# Patient Record
Sex: MALE | Race: BLACK OR AFRICAN AMERICAN | Employment: FULL TIME | URBAN - METROPOLITAN AREA
[De-identification: names, ages, dates, MRNs, and addresses within clinical notes are randomized per-mention and may not be internally consistent; named-entity substitution may affect disease eponyms.]

---

## 2020-02-16 ENCOUNTER — HOSPITAL ENCOUNTER (OUTPATIENT)
Age: 40
Setting detail: OBSERVATION
Discharge: HOME | End: 2020-02-18
Attending: EMERGENCY MEDICINE | Admitting: HOSPITALIST
Payer: COMMERCIAL

## 2020-02-16 DIAGNOSIS — E10.10 DIABETIC KETOACIDOSIS WITHOUT COMA ASSOCIATED WITH TYPE 1 DIABETES MELLITUS (HCC): Primary | ICD-10-CM

## 2020-02-16 PROBLEM — R73.9 HYPERGLYCEMIA: Status: ACTIVE | Noted: 2020-02-16

## 2020-02-16 LAB
ADMINISTERED INITIALS, ADMINIT: NORMAL
ALBUMIN SERPL-MCNC: 5.1 G/DL (ref 3.5–5)
ALBUMIN/GLOB SERPL: 1.1 {RATIO} (ref 1.1–2.2)
ALP SERPL-CCNC: 105 U/L (ref 45–117)
ALT SERPL-CCNC: 31 U/L (ref 12–78)
ANION GAP SERPL CALC-SCNC: 10 MMOL/L (ref 5–15)
ANION GAP SERPL CALC-SCNC: 16 MMOL/L (ref 5–15)
ANION GAP SERPL CALC-SCNC: 7 MMOL/L (ref 5–15)
ARTERIAL PATENCY WRIST A: ABNORMAL
AST SERPL-CCNC: 14 U/L (ref 15–37)
BASE DEFICIT BLD-SCNC: 1 MMOL/L
BASOPHILS # BLD: 0 K/UL (ref 0–0.1)
BASOPHILS NFR BLD: 0 % (ref 0–1)
BDY SITE: ABNORMAL
BILIRUB SERPL-MCNC: 1 MG/DL (ref 0.2–1)
BUN SERPL-MCNC: 14 MG/DL (ref 6–20)
BUN SERPL-MCNC: 14 MG/DL (ref 6–20)
BUN SERPL-MCNC: 19 MG/DL (ref 6–20)
BUN/CREAT SERPL: 10 (ref 12–20)
BUN/CREAT SERPL: 10 (ref 12–20)
BUN/CREAT SERPL: 11 (ref 12–20)
CA-I BLD-SCNC: 1.16 MMOL/L (ref 1.12–1.32)
CALCIUM SERPL-MCNC: 10.4 MG/DL (ref 8.5–10.1)
CALCIUM SERPL-MCNC: 8.2 MG/DL (ref 8.5–10.1)
CALCIUM SERPL-MCNC: 9.1 MG/DL (ref 8.5–10.1)
CHLORIDE SERPL-SCNC: 104 MMOL/L (ref 97–108)
CHLORIDE SERPL-SCNC: 104 MMOL/L (ref 97–108)
CHLORIDE SERPL-SCNC: 90 MMOL/L (ref 97–108)
CO2 SERPL-SCNC: 22 MMOL/L (ref 21–32)
CO2 SERPL-SCNC: 22 MMOL/L (ref 21–32)
CO2 SERPL-SCNC: 23 MMOL/L (ref 21–32)
CREAT SERPL-MCNC: 1.23 MG/DL (ref 0.7–1.3)
CREAT SERPL-MCNC: 1.35 MG/DL (ref 0.7–1.3)
CREAT SERPL-MCNC: 1.92 MG/DL (ref 0.7–1.3)
D50 ADMINISTERED, D50ADM: 0 ML
D50 ORDER, D50ORD: 0 ML
DIFFERENTIAL METHOD BLD: ABNORMAL
EOSINOPHIL # BLD: 0.1 K/UL (ref 0–0.4)
EOSINOPHIL NFR BLD: 1 % (ref 0–7)
ERYTHROCYTE [DISTWIDTH] IN BLOOD BY AUTOMATED COUNT: 12.2 % (ref 11.5–14.5)
EST. AVERAGE GLUCOSE BLD GHB EST-MCNC: 303 MG/DL
GAS FLOW.O2 O2 DELIVERY SYS: ABNORMAL L/MIN
GLOBULIN SER CALC-MCNC: 4.6 G/DL (ref 2–4)
GLSCOM COMMENTS: NORMAL
GLUCOSE BLD STRIP.AUTO-MCNC: 218 MG/DL (ref 65–100)
GLUCOSE BLD STRIP.AUTO-MCNC: 225 MG/DL (ref 65–100)
GLUCOSE BLD STRIP.AUTO-MCNC: 250 MG/DL (ref 65–100)
GLUCOSE BLD STRIP.AUTO-MCNC: 296 MG/DL (ref 65–100)
GLUCOSE BLD STRIP.AUTO-MCNC: 322 MG/DL (ref 65–100)
GLUCOSE BLD STRIP.AUTO-MCNC: 334 MG/DL (ref 65–100)
GLUCOSE BLD STRIP.AUTO-MCNC: 370 MG/DL (ref 65–100)
GLUCOSE BLD STRIP.AUTO-MCNC: >600 MG/DL (ref 65–100)
GLUCOSE BLD STRIP.AUTO-MCNC: >600 MG/DL (ref 65–100)
GLUCOSE SERPL-MCNC: 304 MG/DL (ref 65–100)
GLUCOSE SERPL-MCNC: 328 MG/DL (ref 65–100)
GLUCOSE SERPL-MCNC: 668 MG/DL (ref 65–100)
GLUCOSE, GLC: 218 MG/DL
GLUCOSE, GLC: 225 MG/DL
GLUCOSE, GLC: 250 MG/DL
GLUCOSE, GLC: 296 MG/DL
GLUCOSE, GLC: 334 MG/DL
GLUCOSE, GLC: 370 MG/DL
HBA1C MFR BLD: 12.2 % (ref 4–5.6)
HCO3 BLD-SCNC: 25.2 MMOL/L (ref 22–26)
HCT VFR BLD AUTO: 49.4 % (ref 36.6–50.3)
HGB BLD-MCNC: 17.3 G/DL (ref 12.1–17)
HIGH TARGET, HITG: 300 MG/DL
IMM GRANULOCYTES # BLD AUTO: 0.1 K/UL (ref 0–0.04)
IMM GRANULOCYTES NFR BLD AUTO: 1 % (ref 0–0.5)
INSULIN ADMINSTERED, INSADM: 11 UNITS/HOUR
INSULIN ADMINSTERED, INSADM: 3.3 UNITS/HOUR
INSULIN ADMINSTERED, INSADM: 3.8 UNITS/HOUR
INSULIN ADMINSTERED, INSADM: 4.7 UNITS/HOUR
INSULIN ADMINSTERED, INSADM: 6.3 UNITS/HOUR
INSULIN ADMINSTERED, INSADM: 9.3 UNITS/HOUR
INSULIN ORDER, INSORD: 11 UNITS/HOUR
INSULIN ORDER, INSORD: 3.3 UNITS/HOUR
INSULIN ORDER, INSORD: 3.8 UNITS/HOUR
INSULIN ORDER, INSORD: 4.7 UNITS/HOUR
INSULIN ORDER, INSORD: 6.3 UNITS/HOUR
INSULIN ORDER, INSORD: 9.3 UNITS/HOUR
LOW TARGET, LOT: 200 MG/DL
LYMPHOCYTES # BLD: 3.2 K/UL (ref 0.8–3.5)
LYMPHOCYTES NFR BLD: 27 % (ref 12–49)
MAGNESIUM SERPL-MCNC: 1.9 MG/DL (ref 1.6–2.4)
MCH RBC QN AUTO: 28.9 PG (ref 26–34)
MCHC RBC AUTO-ENTMCNC: 35 G/DL (ref 30–36.5)
MCV RBC AUTO: 82.6 FL (ref 80–99)
MINUTES UNTIL NEXT BG, NBG: 60 MIN
MONOCYTES # BLD: 0.6 K/UL (ref 0–1)
MONOCYTES NFR BLD: 6 % (ref 5–13)
MULTIPLIER, MUL: 0.02
MULTIPLIER, MUL: 0.03
MULTIPLIER, MUL: 0.04
MULTIPLIER, MUL: 0.04
NEUTS SEG # BLD: 7.6 K/UL (ref 1.8–8)
NEUTS SEG NFR BLD: 65 % (ref 32–75)
NRBC # BLD: 0 K/UL (ref 0–0.01)
NRBC BLD-RTO: 0 PER 100 WBC
ORDER INITIALS, ORDINIT: NORMAL
PCO2 BLD: 48.2 MMHG (ref 35–45)
PH BLD: 7.33 [PH] (ref 7.35–7.45)
PHOSPHATE SERPL-MCNC: 2.5 MG/DL (ref 2.6–4.7)
PLATELET # BLD AUTO: 229 K/UL (ref 150–400)
PMV BLD AUTO: 12.5 FL (ref 8.9–12.9)
PO2 BLD: 39 MMHG (ref 80–100)
POTASSIUM SERPL-SCNC: 3.5 MMOL/L (ref 3.5–5.1)
POTASSIUM SERPL-SCNC: 3.7 MMOL/L (ref 3.5–5.1)
POTASSIUM SERPL-SCNC: 4.2 MMOL/L (ref 3.5–5.1)
PROT SERPL-MCNC: 9.7 G/DL (ref 6.4–8.2)
RBC # BLD AUTO: 5.98 M/UL (ref 4.1–5.7)
SAO2 % BLD: 69 % (ref 92–97)
SERVICE CMNT-IMP: ABNORMAL
SODIUM SERPL-SCNC: 129 MMOL/L (ref 136–145)
SODIUM SERPL-SCNC: 133 MMOL/L (ref 136–145)
SODIUM SERPL-SCNC: 136 MMOL/L (ref 136–145)
SPECIMEN TYPE: ABNORMAL
TOTAL RESP. RATE, ITRR: 18
WBC # BLD AUTO: 11.6 K/UL (ref 4.1–11.1)

## 2020-02-16 PROCEDURE — 82962 GLUCOSE BLOOD TEST: CPT

## 2020-02-16 PROCEDURE — 74011250637 HC RX REV CODE- 250/637: Performed by: STUDENT IN AN ORGANIZED HEALTH CARE EDUCATION/TRAINING PROGRAM

## 2020-02-16 PROCEDURE — 74011250637 HC RX REV CODE- 250/637: Performed by: HOSPITALIST

## 2020-02-16 PROCEDURE — 96361 HYDRATE IV INFUSION ADD-ON: CPT

## 2020-02-16 PROCEDURE — 74011636637 HC RX REV CODE- 636/637: Performed by: HOSPITALIST

## 2020-02-16 PROCEDURE — 80053 COMPREHEN METABOLIC PANEL: CPT

## 2020-02-16 PROCEDURE — 96366 THER/PROPH/DIAG IV INF ADDON: CPT

## 2020-02-16 PROCEDURE — 74011250636 HC RX REV CODE- 250/636: Performed by: INTERNAL MEDICINE

## 2020-02-16 PROCEDURE — 74011636637 HC RX REV CODE- 636/637: Performed by: STUDENT IN AN ORGANIZED HEALTH CARE EDUCATION/TRAINING PROGRAM

## 2020-02-16 PROCEDURE — 83036 HEMOGLOBIN GLYCOSYLATED A1C: CPT

## 2020-02-16 PROCEDURE — 96376 TX/PRO/DX INJ SAME DRUG ADON: CPT

## 2020-02-16 PROCEDURE — 74011000258 HC RX REV CODE- 258: Performed by: HOSPITALIST

## 2020-02-16 PROCEDURE — 84100 ASSAY OF PHOSPHORUS: CPT

## 2020-02-16 PROCEDURE — 74011636637 HC RX REV CODE- 636/637: Performed by: INTERNAL MEDICINE

## 2020-02-16 PROCEDURE — 82803 BLOOD GASES ANY COMBINATION: CPT

## 2020-02-16 PROCEDURE — 99218 HC RM OBSERVATION: CPT

## 2020-02-16 PROCEDURE — 99284 EMERGENCY DEPT VISIT MOD MDM: CPT

## 2020-02-16 PROCEDURE — 80048 BASIC METABOLIC PNL TOTAL CA: CPT

## 2020-02-16 PROCEDURE — 74011250636 HC RX REV CODE- 250/636: Performed by: STUDENT IN AN ORGANIZED HEALTH CARE EDUCATION/TRAINING PROGRAM

## 2020-02-16 PROCEDURE — 85025 COMPLETE CBC W/AUTO DIFF WBC: CPT

## 2020-02-16 PROCEDURE — 96365 THER/PROPH/DIAG IV INF INIT: CPT

## 2020-02-16 PROCEDURE — 36415 COLL VENOUS BLD VENIPUNCTURE: CPT

## 2020-02-16 PROCEDURE — 65660000000 HC RM CCU STEPDOWN

## 2020-02-16 PROCEDURE — 83735 ASSAY OF MAGNESIUM: CPT

## 2020-02-16 RX ORDER — ENOXAPARIN SODIUM 100 MG/ML
40 INJECTION SUBCUTANEOUS EVERY 24 HOURS
Status: DISCONTINUED | OUTPATIENT
Start: 2020-02-16 | End: 2020-02-18 | Stop reason: HOSPADM

## 2020-02-16 RX ORDER — INSULIN LISPRO 100 [IU]/ML
INJECTION, SOLUTION INTRAVENOUS; SUBCUTANEOUS
Status: DISCONTINUED | OUTPATIENT
Start: 2020-02-16 | End: 2020-02-16

## 2020-02-16 RX ORDER — SODIUM CHLORIDE 0.9 % (FLUSH) 0.9 %
5-40 SYRINGE (ML) INJECTION EVERY 8 HOURS
Status: DISCONTINUED | OUTPATIENT
Start: 2020-02-16 | End: 2020-02-18 | Stop reason: HOSPADM

## 2020-02-16 RX ORDER — SODIUM CHLORIDE 450 MG/100ML
100 INJECTION, SOLUTION INTRAVENOUS CONTINUOUS
Status: DISCONTINUED | OUTPATIENT
Start: 2020-02-16 | End: 2020-02-16

## 2020-02-16 RX ORDER — DEXTROSE 50 % IN WATER (D50W) INTRAVENOUS SYRINGE
12.5-25 AS NEEDED
Status: DISCONTINUED | OUTPATIENT
Start: 2020-02-16 | End: 2020-02-18 | Stop reason: HOSPADM

## 2020-02-16 RX ORDER — MAGNESIUM SULFATE 100 %
4 CRYSTALS MISCELLANEOUS AS NEEDED
Status: DISCONTINUED | OUTPATIENT
Start: 2020-02-16 | End: 2020-02-16

## 2020-02-16 RX ORDER — ACETAMINOPHEN 325 MG/1
650 TABLET ORAL
Status: DISCONTINUED | OUTPATIENT
Start: 2020-02-16 | End: 2020-02-18 | Stop reason: HOSPADM

## 2020-02-16 RX ORDER — DEXTROSE, SODIUM CHLORIDE, AND POTASSIUM CHLORIDE 5; .45; .15 G/100ML; G/100ML; G/100ML
100 INJECTION INTRAVENOUS CONTINUOUS
Status: DISCONTINUED | OUTPATIENT
Start: 2020-02-16 | End: 2020-02-16

## 2020-02-16 RX ORDER — INSULIN GLARGINE 100 [IU]/ML
30 INJECTION, SOLUTION SUBCUTANEOUS
Status: DISCONTINUED | OUTPATIENT
Start: 2020-02-16 | End: 2020-02-18 | Stop reason: HOSPADM

## 2020-02-16 RX ORDER — MAGNESIUM SULFATE 100 %
4 CRYSTALS MISCELLANEOUS AS NEEDED
Status: DISCONTINUED | OUTPATIENT
Start: 2020-02-16 | End: 2020-02-18 | Stop reason: HOSPADM

## 2020-02-16 RX ORDER — INSULIN LISPRO 100 [IU]/ML
INJECTION, SOLUTION INTRAVENOUS; SUBCUTANEOUS
Status: DISCONTINUED | OUTPATIENT
Start: 2020-02-16 | End: 2020-02-18 | Stop reason: HOSPADM

## 2020-02-16 RX ORDER — ONDANSETRON 2 MG/ML
4 INJECTION INTRAMUSCULAR; INTRAVENOUS
Status: DISCONTINUED | OUTPATIENT
Start: 2020-02-16 | End: 2020-02-18 | Stop reason: HOSPADM

## 2020-02-16 RX ORDER — POTASSIUM CHLORIDE 20 MEQ/1
40 TABLET, EXTENDED RELEASE ORAL
Status: COMPLETED | OUTPATIENT
Start: 2020-02-16 | End: 2020-02-16

## 2020-02-16 RX ORDER — AMLODIPINE BESYLATE 5 MG/1
5 TABLET ORAL DAILY
Status: DISCONTINUED | OUTPATIENT
Start: 2020-02-16 | End: 2020-02-18 | Stop reason: HOSPADM

## 2020-02-16 RX ORDER — DEXTROSE 50 % IN WATER (D50W) INTRAVENOUS SYRINGE
25-50 AS NEEDED
Status: DISCONTINUED | OUTPATIENT
Start: 2020-02-16 | End: 2020-02-16

## 2020-02-16 RX ORDER — SODIUM CHLORIDE 0.9 % (FLUSH) 0.9 %
5-40 SYRINGE (ML) INJECTION AS NEEDED
Status: DISCONTINUED | OUTPATIENT
Start: 2020-02-16 | End: 2020-02-18 | Stop reason: HOSPADM

## 2020-02-16 RX ORDER — POTASSIUM CHLORIDE AND SODIUM CHLORIDE 450; 150 MG/100ML; MG/100ML
INJECTION, SOLUTION INTRAVENOUS CONTINUOUS
Status: DISCONTINUED | OUTPATIENT
Start: 2020-02-16 | End: 2020-02-17

## 2020-02-16 RX ADMIN — INSULIN LISPRO 2 UNITS: 100 INJECTION, SOLUTION INTRAVENOUS; SUBCUTANEOUS at 22:12

## 2020-02-16 RX ADMIN — SODIUM CHLORIDE 4.7 UNITS/HR: 9 INJECTION, SOLUTION INTRAVENOUS at 16:34

## 2020-02-16 RX ADMIN — AMLODIPINE BESYLATE 5 MG: 5 TABLET ORAL at 16:40

## 2020-02-16 RX ADMIN — INSULIN GLARGINE 30 UNITS: 100 INJECTION, SOLUTION SUBCUTANEOUS at 22:12

## 2020-02-16 RX ADMIN — SODIUM CHLORIDE 3.3 UNITS/HR: 9 INJECTION, SOLUTION INTRAVENOUS at 18:45

## 2020-02-16 RX ADMIN — HUMAN INSULIN 10 UNITS: 100 INJECTION, SOLUTION SUBCUTANEOUS at 14:46

## 2020-02-16 RX ADMIN — Medication 10 ML: at 22:13

## 2020-02-16 RX ADMIN — SODIUM CHLORIDE 100 ML/HR: 450 INJECTION, SOLUTION INTRAVENOUS at 16:38

## 2020-02-16 RX ADMIN — SODIUM CHLORIDE 1000 ML: 900 INJECTION, SOLUTION INTRAVENOUS at 12:59

## 2020-02-16 RX ADMIN — SODIUM CHLORIDE 1000 ML: 900 INJECTION, SOLUTION INTRAVENOUS at 14:48

## 2020-02-16 RX ADMIN — POTASSIUM CHLORIDE 40 MEQ: 20 TABLET, EXTENDED RELEASE ORAL at 14:50

## 2020-02-16 RX ADMIN — DEXTROSE MONOHYDRATE, SODIUM CHLORIDE, AND POTASSIUM CHLORIDE 100 ML/HR: 50; 4.5; 1.49 INJECTION, SOLUTION INTRAVENOUS at 19:52

## 2020-02-16 RX ADMIN — POTASSIUM CHLORIDE AND SODIUM CHLORIDE: 450; 150 INJECTION, SOLUTION INTRAVENOUS at 22:15

## 2020-02-16 RX ADMIN — SODIUM CHLORIDE 1000 ML: 900 INJECTION, SOLUTION INTRAVENOUS at 14:44

## 2020-02-16 NOTE — PROGRESS NOTES
HP dictated    Mild DKA on Insulin drip stop when gap is closed  Intravascular volume depletion IV fluids.

## 2020-02-16 NOTE — ED PROVIDER NOTES
EMERGENCY DEPARTMENT HISTORY AND PHYSICAL EXAM          Date: 2/16/2020  Patient Name: Lavell Stephens  Attending of Record: Racheal Santiago    History of Presenting Illness     Chief Complaint   Patient presents with    High Blood Sugar     was sent by urgent care for 's; no PMH of DM; reports blurred vision and excessive thirst for one week; denies pain       History Provided By: Patient    HPI: Lavell Stephens is a 46 y.o. male with a medical history of hypertension who presents for hyper glycemia. Patient reports over the last several days he has had polyuria in addition to polydipsia. Patient has been having ongoing diaphoresis for several days as well in addition to nausea and vomiting. Patient denies any fever or abdominal pain. Has no history of diabetes that he is aware of. [facility] and was found to be hyperglycemic in the 800s and was transferred here for further care. PCP: No primary care provider on file. There are no other complaints, changes, or physical findings at this time.      Current Facility-Administered Medications   Medication Dose Route Frequency Provider Last Rate Last Dose    sodium chloride 0.9 % bolus infusion 1,000 mL  1,000 mL IntraVENous ONCE Herrera Kramer MD 1,000 mL/hr at 02/16/20 1259 1,000 mL at 02/16/20 1259       Past History     Past Medical History:  Past Medical History:   Diagnosis Date    Asthma     Bronchitis     Hypertension        Past Surgical History:  -denies   Family History:  -DM    Social History:  Social History     Tobacco Use    Smoking status: Current Every Day Smoker     Packs/day: 0.25    Tobacco comment: 1 per day   Substance Use Topics    Alcohol use: No    Drug use: No       Allergies:  No Known Allergies      Review of Systems   Review of Systems    General: fatigue, diaphoresis     Head and Neck:  No headache    Cardiovascular: No chest pain or palpitations    Pulmonary: No SOB, cough or dyspnea    GI: No abdominal pain, diarrhea or constipation    : No dysuria or difficulty urinating     Musculoskeletal: No swelling or deformity     Neuro: No numbness or tingling    Endo: polyuria and polydipsia     Skin: No rashes or wounds     Psych: No depression or anxiety    Physical Exam   Physical Exam  Gen: jovial, well appearing   HEENT: Normocephalic, atraumatic. EOMI. Oral mucosa dry. Neck: Supple, trachea midline  Heart: tachycardic rate, regular rhythm. No murmurs, rubs or bruits. Lungs: Full chest expansion. CTAB. No wheezing or abnormal breath sounds. Abdomen: Soft, nontender, nondistended. No rebound or guarding   Extremities: No edema, No erythema   Nervous System: CN II-XII grossly intact, gross motor and sensation intact   Musculosketal: Strength 5+ in all extremities. Full ROM. Radial pulses 2+  Skin: No rashes, cuts or breaks in the skin. Diagnostic Study Results     Labs -     Recent Results (from the past 12 hour(s))   GLUCOSE, POC    Collection Time: 02/16/20 12:49 PM   Result Value Ref Range    Glucose (POC) >600 (HH) 65 - 100 mg/dL    Performed by Whit Gary, POC    Collection Time: 02/16/20 12:51 PM   Result Value Ref Range    Glucose (POC) >600 (HH) 65 - 100 mg/dL    Performed by Blanca Cantrell        Radiologic Studies -   No orders to display     CT Results  (Last 48 hours)    None        CXR Results  (Last 48 hours)    None            Medical Decision Making   I am the first provider for this patient. I reviewed the vital signs, available nursing notes, past medical history, past surgical history, family history and social history. Vital Signs-Reviewed the patient's vital signs.   Patient Vitals for the past 12 hrs:   Temp Pulse Resp BP SpO2   02/16/20 1304    (!) 135/98    02/16/20 1243 97.7 °F (36.5 °C) (!) 137 18 (!) 180/111 100 %       Records Reviewed: Nursing Notes    Provider Notes (Medical Decision Making):     DDx: This is a 72-year-old male who presents with hyperglycemia. Vital signs show tachycardia. Suspect this is new onset diabetes. No sign of infection or alternate etiology. Patient does look significantly dehydrated, will hydrate with IV fluids. Will evaluate for DKA or HHS. Will treat the patient with IV insulin and replete electrolytes aggressively. ED Course:   Initial assessment performed. The patients presenting problems have been discussed, and they are in agreement with the care plan formulated and outlined with them. I have encouraged them to ask questions as they arise throughout their visit. ED Course as of Feb 16 1408   Sun Feb 16, 2020   1335 [facility] to see residents patient. Patient states that he has lost weight, has been having polyuria and polydipsia. Noted to have a very high sugar here. Patient denies any infectious triggers. No prior history of diabetes but does eat a lot of carbs as well as sweets. States that he loves them. His father does have diabetes. On physical exam very dry mucous membranes, patient very alert and answering questions and oriented. Differential includes type I versus type 2 diabetes, will rule out DKA by getting labs and urinalysis as well as VBG. Will need insulin and fluids    [JS]      ED Course User Index  [JS] Wynonia Kayser, MD       Progress note:  Patient remains clinically stable. Ongoing tachycardia. Patient updated on lab results which were significant for hyponatremia, hyperglycemia, increased anion gap, probable acute kidney injury. Discussed with hospitalist who will admit the patient. Additional IV fluids ordered per their request.  IV insulin ordered as well. Diagnosis     Clinical Impression: No diagnosis found. Disposition:  -Admit     PLAN:  1. There are no discharge medications for this patient.     2.   Follow-up Information    None       Return to ED if worse

## 2020-02-16 NOTE — ED NOTES
TRANSFER - OUT REPORT:    Verbal report given to alonso(name) on Alexandro Mckinney  being transferred to pcu(unit) for routine progression of care       Report consisted of patients Situation, Background, Assessment and   Recommendations(SBAR). Information from the following report(s) SBAR, ED Summary and MAR was reviewed with the receiving nurse. Lines:   Peripheral IV 02/16/20 Right Antecubital (Active)   Site Assessment Clean, dry, & intact 2/16/2020  1:00 PM   Phlebitis Assessment 0 2/16/2020  1:00 PM   Infiltration Assessment 0 2/16/2020  1:00 PM   Dressing Status Clean, dry, & intact 2/16/2020  1:00 PM   Dressing Type Transparent 2/16/2020  1:00 PM   Hub Color/Line Status Pink 2/16/2020  1:00 PM        Opportunity for questions and clarification was provided.       Patient transported with:   Registered Nurse monitor

## 2020-02-17 LAB
ANION GAP SERPL CALC-SCNC: 8 MMOL/L (ref 5–15)
BUN SERPL-MCNC: 12 MG/DL (ref 6–20)
BUN/CREAT SERPL: 10 (ref 12–20)
CALCIUM SERPL-MCNC: 9.2 MG/DL (ref 8.5–10.1)
CHLORIDE SERPL-SCNC: 103 MMOL/L (ref 97–108)
CO2 SERPL-SCNC: 23 MMOL/L (ref 21–32)
CREAT SERPL-MCNC: 1.2 MG/DL (ref 0.7–1.3)
ERYTHROCYTE [DISTWIDTH] IN BLOOD BY AUTOMATED COUNT: 12.2 % (ref 11.5–14.5)
EST. AVERAGE GLUCOSE BLD GHB EST-MCNC: 295 MG/DL
GLUCOSE BLD STRIP.AUTO-MCNC: 285 MG/DL (ref 65–100)
GLUCOSE BLD STRIP.AUTO-MCNC: 288 MG/DL (ref 65–100)
GLUCOSE BLD STRIP.AUTO-MCNC: 288 MG/DL (ref 65–100)
GLUCOSE BLD STRIP.AUTO-MCNC: 295 MG/DL (ref 65–100)
GLUCOSE SERPL-MCNC: 274 MG/DL (ref 65–100)
HBA1C MFR BLD: 11.9 % (ref 4–5.6)
HCT VFR BLD AUTO: 44 % (ref 36.6–50.3)
HGB BLD-MCNC: 14.6 G/DL (ref 12.1–17)
MAGNESIUM SERPL-MCNC: 2.2 MG/DL (ref 1.6–2.4)
MCH RBC QN AUTO: 28.3 PG (ref 26–34)
MCHC RBC AUTO-ENTMCNC: 33.2 G/DL (ref 30–36.5)
MCV RBC AUTO: 85.4 FL (ref 80–99)
NRBC # BLD: 0 K/UL (ref 0–0.01)
NRBC BLD-RTO: 0 PER 100 WBC
PLATELET # BLD AUTO: 181 K/UL (ref 150–400)
PMV BLD AUTO: 11.7 FL (ref 8.9–12.9)
POTASSIUM SERPL-SCNC: 4.3 MMOL/L (ref 3.5–5.1)
RBC # BLD AUTO: 5.15 M/UL (ref 4.1–5.7)
SERVICE CMNT-IMP: ABNORMAL
SODIUM SERPL-SCNC: 134 MMOL/L (ref 136–145)
WBC # BLD AUTO: 10.1 K/UL (ref 4.1–11.1)

## 2020-02-17 PROCEDURE — 36415 COLL VENOUS BLD VENIPUNCTURE: CPT

## 2020-02-17 PROCEDURE — 74011636637 HC RX REV CODE- 636/637: Performed by: INTERNAL MEDICINE

## 2020-02-17 PROCEDURE — 65270000015 HC RM PRIVATE ONCOLOGY

## 2020-02-17 PROCEDURE — 74011250637 HC RX REV CODE- 250/637: Performed by: HOSPITALIST

## 2020-02-17 PROCEDURE — 74011250636 HC RX REV CODE- 250/636: Performed by: INTERNAL MEDICINE

## 2020-02-17 PROCEDURE — 83036 HEMOGLOBIN GLYCOSYLATED A1C: CPT

## 2020-02-17 PROCEDURE — 77010033678 HC OXYGEN DAILY

## 2020-02-17 PROCEDURE — 99218 HC RM OBSERVATION: CPT

## 2020-02-17 PROCEDURE — 96361 HYDRATE IV INFUSION ADD-ON: CPT

## 2020-02-17 PROCEDURE — 83735 ASSAY OF MAGNESIUM: CPT

## 2020-02-17 PROCEDURE — 80048 BASIC METABOLIC PNL TOTAL CA: CPT

## 2020-02-17 PROCEDURE — 82962 GLUCOSE BLOOD TEST: CPT

## 2020-02-17 PROCEDURE — 85027 COMPLETE CBC AUTOMATED: CPT

## 2020-02-17 RX ORDER — INSULIN LISPRO 100 [IU]/ML
5 INJECTION, SOLUTION INTRAVENOUS; SUBCUTANEOUS
Status: DISCONTINUED | OUTPATIENT
Start: 2020-02-17 | End: 2020-02-18 | Stop reason: HOSPADM

## 2020-02-17 RX ORDER — SODIUM CHLORIDE AND POTASSIUM CHLORIDE .9; .15 G/100ML; G/100ML
SOLUTION INTRAVENOUS CONTINUOUS
Status: DISCONTINUED | OUTPATIENT
Start: 2020-02-17 | End: 2020-02-18 | Stop reason: HOSPADM

## 2020-02-17 RX ADMIN — INSULIN LISPRO 5 UNITS: 100 INJECTION, SOLUTION INTRAVENOUS; SUBCUTANEOUS at 11:29

## 2020-02-17 RX ADMIN — INSULIN LISPRO 3 UNITS: 100 INJECTION, SOLUTION INTRAVENOUS; SUBCUTANEOUS at 21:29

## 2020-02-17 RX ADMIN — INSULIN LISPRO 5 UNITS: 100 INJECTION, SOLUTION INTRAVENOUS; SUBCUTANEOUS at 07:57

## 2020-02-17 RX ADMIN — Medication 10 ML: at 21:29

## 2020-02-17 RX ADMIN — AMLODIPINE BESYLATE 5 MG: 5 TABLET ORAL at 09:40

## 2020-02-17 RX ADMIN — INSULIN GLARGINE 30 UNITS: 100 INJECTION, SOLUTION SUBCUTANEOUS at 21:28

## 2020-02-17 RX ADMIN — INSULIN LISPRO 5 UNITS: 100 INJECTION, SOLUTION INTRAVENOUS; SUBCUTANEOUS at 17:11

## 2020-02-17 RX ADMIN — INSULIN LISPRO 5 UNITS: 100 INJECTION, SOLUTION INTRAVENOUS; SUBCUTANEOUS at 17:12

## 2020-02-17 RX ADMIN — Medication 5 ML: at 06:05

## 2020-02-17 RX ADMIN — Medication 10 ML: at 15:05

## 2020-02-17 RX ADMIN — SODIUM CHLORIDE AND POTASSIUM CHLORIDE: 9; 1.49 INJECTION, SOLUTION INTRAVENOUS at 15:03

## 2020-02-17 RX ADMIN — SODIUM CHLORIDE AND POTASSIUM CHLORIDE: 9; 1.49 INJECTION, SOLUTION INTRAVENOUS at 21:47

## 2020-02-17 RX ADMIN — INSULIN LISPRO 5 UNITS: 100 INJECTION, SOLUTION INTRAVENOUS; SUBCUTANEOUS at 11:28

## 2020-02-17 NOTE — ROUTINE PROCESS
..Report received from  Erich Castaneda, [facility]. SBAR was discussed.  
 
Des Michelle RN

## 2020-02-17 NOTE — PROGRESS NOTES
Hospitalist Progress Note    NAME: Aba Morris   :  1980   MRN:  [id number]       Assessment / Plan:    DM II, new diagnosis  Mild DKA, resolved  Hyperglycemia  Dehydration  -A1c 11.8% on admission  - family hx of DM  - off insulin drip  - DM education   - correction scale and lantus 30 units nightly (around 0.3 unit/kg)  -patient is worried about starting Insulin, so given his high A1c and weight consider GLP-1 inhibitor as pot patient if possible. HTN  -uncontrolled  - cont Norvasc 5 mg daily    30.0 - 39.9 Obese / Body mass index is 38.19 kg/m². Code status: Full  Prophylaxis: Lovenox  Recommended Disposition: Home w/Family     Subjective:     Chief Complaint / Reason for Physician Visit  DKA/New DM/dehydration  Discussed with RN events overnight. Family at bedside, all questions were answered. Review of Systems:  Symptom Y/N Comments  Symptom Y/N Comments   Fever/Chills n   Chest Pain n    Poor Appetite n   Edema n    Cough n   Abdominal Pain n    Sputum n   Joint Pain     SOB/NOLEN n   Pruritis/Rash     Nausea/vomit n   Tolerating PT/OT y    Diarrhea n   Tolerating Diet y    Constipation n   Other           Objective:     VITALS:   Last 24hrs VS reviewed since prior progress note. Most recent are:  Patient Vitals for the past 24 hrs:   Temp Pulse Resp BP SpO2   20 1109 98 °F (36.7 °C) (!) 105 18 134/77 97 %   20 0940  (!) 112  126/73    20 0742 98.9 °F (37.2 °C) (!) 114 19 (!) 142/95 98 %   20 1930 99 °F (37.2 °C) (!) 103 18 137/74 98 %   20 1724    (!) 136/97    20 1723 99.4 °F (37.4 °C) (!) 114 18 (!) 136/97 99 %   20 1700  (!) 109  119/74 98 %       Intake/Output Summary (Last 24 hours) at 2020 1513  Last data filed at 2020 1127  Gross per 24 hour   Intake 1170 ml   Output    Net 1170 ml        PHYSICAL EXAM:  General: WD, WN. Alert, cooperative, no acute distress    EENT:  EOMI. Anicteric sclerae.  MMM  Resp:  CTA bilaterally, no wheezing or rales. No accessory muscle use  CV:  Regular  rhythm,  No edema  GI:  Soft, Non distended, Non tender.  +Bowel sounds  Neurologic:  Alert and oriented X 3, normal speech,   Psych:   Good insight. Not anxious nor agitated  Skin:  No rashes. No jaundice    Reviewed most current lab test results and cultures  YES  Reviewed most current radiology test results   YES  Review and summation of old records today    NO  Reviewed patient's current orders and MAR    YES  PMH/SH reviewed - no change compared to H&P  ________________________________________________________________________  Care Plan discussed with:    Comments   Patient x    Family  x    RN x    Care Manager x    Consultant                       x Multidiciplinary team rounds were held today with , nursing, pharmacist and clinical coordinator. Patient's plan of care was discussed; medications were reviewed and discharge planning was addressed. ________________________________________________________________________  Total NON critical care TIME:  35   Minutes    Total CRITICAL CARE TIME Spent:   Minutes non procedure based      Comments   >50% of visit spent in counseling and coordination of care     ________________________________________________________________________  Analy Ricci MD     Procedures: see electronic medical records for all procedures/Xrays and details which were not copied into this note but were reviewed prior to creation of Plan. LABS:  I reviewed today's most current labs and imaging studies.   Pertinent labs include:  Recent Labs     02/17/20  0429 02/16/20  1251   WBC 10.1 11.6*   HGB 14.6 17.3*   HCT 44.0 49.4    229     Recent Labs     02/17/20  0429 02/16/20  2051 02/16/20  1628 02/16/20  1251   * 133* 136 129*   K 4.3 3.5 3.7 4.2    104 104 90*   CO2 23 22 22 23   * 328* 304* 668*   BUN 12 14 14 19   CREA 1.20 1.23 1.35* 1.92*   CA 9.2 9.1 8.2* 10.4*   MG 2.2  --  1.9  --    PHOS  --   --  2.5*  --    ALB  --   --   --  5.1*   TBILI  --   --   --  1.0   SGOT  --   --   --  14*   ALT  --   --   --  31       Signed: Haider Fountain MD

## 2020-02-17 NOTE — PROGRESS NOTES
Bedside and Verbal shift change report given to Greta Lombardo (oncoming nurse) by Luz Hodgkins (offgoing nurse). Report included the following information SBAR, Kardex, Procedure Summary, Intake/Output, MAR and Recent Results. 2953: Patient given 5 units of insulin for blood sugar of 295, patient sitting up on side of bed eating breakfast.  Has questions about insulin and medications needed for new diagnosis of diabetes. Diabetes management team consult placed.     0940: Diabetes treatment team nurse at bedside to talk with patient and patient's family    0: Hospitalist in to see patient, patient sitting up in chair    1830: Report given to Fouzia Mercer on patient being transferred to room [phone]

## 2020-02-17 NOTE — PROGRESS NOTES
Reason for Admission:   hyperglycemia                   RUR Score:  7%                   Plan for utilizing home health:  Pt has no plans to utilize [facility] at this time but may benefit from a [facility]                        Current Advanced Directive/Advance Care Plan: Pt is a full code and does not have an ACP on file at this time. Transition of Care Plan:  CM met with pt to discuss d/c planning. CM Verified pt demographic, insurance, and PCP. Pt resides in a home with with his parents. He resports that he is independent with ADLs/IADLs and also drives. Pt has supportive family including fiance. Pt reports that he does not have a PCP and goes to Urgent Care when medical attention is needed. [facility] [address] For medications. Pt will discharge home with family. Fiance will transport him home. Pt will decide on his PCP and CM will make appts. CM will continue to follow patient for discharge planning needs and arrange for services as deemed necessary. Care Management Interventions  PCP Verified by CM: Yes  Mode of Transport at Discharge:  Other (see comment)  Transition of Care Consult (CM Consult): Discharge Planning  Current Support Network: Relative's Home  Confirm Follow Up Transport: Family  The Plan for Transition of Care is Related to the Following Treatment Goals : home   Discharge Location  Discharge Placement: Home      Harriet Ricks, Care Manager  [phone]

## 2020-02-17 NOTE — DIABETES MGMT
[facility]  CLINICAL NURSE SPECIALIST CONSULT  [facility]    Presentation   Alexandro Medina is a 46 y.o. male admitted from ER in mild DKA. BG initially in 800s. Hydration & insulin therapy underway. Transitioned off Glucostabilizer with 30 units of Lantus insulin last evening. A1c 11.9%. Current clinical course has been uncomplicated. Patient has newly diagnosed Type 2 diabetes. Strong family hx of diabetes in father, maternal grandmother and many cousins. He went to [facility] 3 weeks ago. No blood drawn per girlfriend. Patient reports not feeling right which prompted him to seek help again. C/o of polyuria, polydipsia and blurred vision. This diagnosis took him by surprise and he plans on \"doing whatever because I don't want to do this again. \"   Consulted by Provider for advanced diabetes nursing assessment and care, specifically related to   [x] Inpatient management strategy  [x] Survival skill education    Subjective   I just learned I have diabetes.     Patients that he was in senior living and gained weight to 260 lbs. Since release in April of 2019, he has lost weight down to current weight. He admits to poor eating choices but is willing to change. Patient reports the following home diabetes self-care practices:  Eating pattern-Mother does cooking in the evenings at home and prepares a meal that is well-balanced as patient's father has diabetes  [x] Breakfast 8am 1-2 [employer] biscuits with hash browns and sweet tea or OJ  [x] Lunch  3pm [employer] , fries and sweet tea. Or leftovers  [x] Dinner  6pm Chicken nuggets or shrimp with fries and sweet tea. If mother makes evening meal, it is a well-balanced meal including protein, starch and vegetables  [x] Beverages Sweet tea. OJ. 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  Is too tired on the weekends to walk or do anything physical  Monitoring pattern-Just diagnosed  Taking medications pattern-Just diagnosed    Objective   Physical exam  General Alert, oriented and in no acute distress. Conversant and cooperative. Frightened of new diagnosis  Vital Signs   Visit Vitals  /73   Pulse (!) 112   Temp 98.9 °F (37.2 °C)   Resp 19   Ht 5' 5\" (1.651 m)   Wt 104.1 kg (229 lb 8 oz)   SpO2 98%   BMI 38.19 kg/m²     Laboratory  Lab Results   Component Value Date/Time    Hemoglobin A1c 11.9 (H) 02/17/2020 04:29 AM     No results found for: LDL, LDLC, DLDLP  Lab Results   Component Value Date/Time    Creatinine 1.20 02/17/2020 04:29 AM     Lab Results   Component Value Date/Time    Sodium 134 (L) 02/17/2020 04:29 AM    Potassium 4.3 02/17/2020 04:29 AM    Chloride 103 02/17/2020 04:29 AM    CO2 23 02/17/2020 04:29 AM    Anion gap 8 02/17/2020 04:29 AM    Glucose 274 (H) 02/17/2020 04:29 AM    BUN 12 02/17/2020 04:29 AM    Creatinine 1.20 02/17/2020 04:29 AM    BUN/Creatinine ratio 10 (L) 02/17/2020 04:29 AM    GFR est AA >60 02/17/2020 04:29 AM    GFR est non-AA >60 02/17/2020 04:29 AM    Calcium 9.2 02/17/2020 04:29 AM    Bilirubin, total 1.0 02/16/2020 12:51 PM    AST (SGOT) 14 (L) 02/16/2020 12:51 PM    Alk. phosphatase 105 02/16/2020 12:51 PM    Protein, total 9.7 (H) 02/16/2020 12:51 PM    Albumin 5.1 (H) 02/16/2020 12:51 PM    Globulin 4.6 (H) 02/16/2020 12:51 PM    A-G Ratio 1.1 02/16/2020 12:51 PM    ALT (SGPT) 31 02/16/2020 12:51 PM     Lab Results   Component Value Date/Time    ALT (SGPT) 31 02/16/2020 12:51 PM       Blood glucose pattern        Assessment and Plan   Nursing Diagnosis Risk for unstable blood glucose pattern   Nursing Intervention Domain 5259 Decision-making Support   Nursing Interventions Examined current inpatient diabetes control   Informed patient of rational for insulin strategy while hospitalized  Instructed patient in basic healthy plate principles, carbohydrate foods and portions.  Given ADA booklet \"Living Well with Diabetes\"  Discussed role of regular physical activity in managing in glucose  Encouraged use of BG monitoring to validate he is in good control     Evaluation   This gentleman, with newly diagnosed Type 2/1 diabetes, hasn't achieved inpatient blood glucose target of 100-180mg/dl. Glucose toxicity not resolved yet. Patient understands the seriousness of this diagnosis and is willing to put self-care practices in place to improve glucose metabolism, including CHO-controlled meals and weekend PA. He understands that metformin is classic first medication for glucose control.     Recommendations   Inpatient recommendations:     Start metformin ER therapy and titrate to full dosing     Agree with Basal insulin for now at  [x] 0.3 units/kg/D    Corrective insulin  [x] Normal sensitivity      Referral   [x] Diabetes Self-Management Training through [facility] (Phone [phone] to schedule appointment)    Billing Code(s)     [x] 40084 IP subsequent hospital care - 39 minutes      ABEBE Mcdaniel  Access via WILLIAN Fonseca [phone]

## 2020-02-17 NOTE — H&P
formerly [facility]  HISTORY AND PHYSICAL    Name:  Doroteo Haile  MR#:  [id number]  :  1980  ACCOUNT #:  [de-identified]  ADMIT DATE:  2020      PRIMARY CARE PHYSICIAN:  None. PRESENTING COMPLAINT:      Sent from [facility] due to high blood sugar. HISTORY OF PRESENTING ILLNESS:      The patient is a 81-year-old -American gentleman who has previous history significant for hypertension. Currently, he does not take any medication, presented to [facility] due to polyuria, polydipsia for the last two weeks. The patient was also complaining of nausea and vomiting. At [facility], he was found to be hyperglycemic with blood sugar around 800. The patient was subsequently transferred to the emergency room. In the emergency room, his blood sugar was 668. He was given 3 L of IV fluids, 10 units of IV insulin. The patient denies having any chest pain or shortness of breath. PAST MEDICAL HISTORY:      Significant for hypertension. ALLERGIES:  NO KNOWN DRUG ALLERGIES. CURRENT MEDICATIONS:  None. SOCIOECONOMIC HISTORY:      The patient smoke cigars. He does not drink. He does not use any drugs. He works as a  and he is single. FAMILY HISTORY:  Significant for diabetes in grandmother and mother. PHYSICAL EXAMINATION:  GENERAL:  The patient is a 46year-old gentleman, not in any acute distress. VITAL SIGNS:  Reveal temperature 97.7, blood pressure 135/98, pulse is 106, respiratory rate is 16. Saturation 100%. HEENT:  Examination reveals pupils are equally reactive to light and accommodation. NECK:  Supple. There is no lymphadenopathy or JVD. CHEST:  Mucous membranes are dry. Chest is clear. No wheezing. No crackles. CARDIOVASCULAR SYSTEM:  S1 and S2, regular. No murmurs. No S3.  ABDOMEN:  Examination reveals no tenderness. No guarding. No rigidity. Bowel sounds are active. EXTREMITIES:  No pedal edema.   CNS:  Examination reveals the patient is alert and oriented. He has normal strength and normal effort. Normal reflexes. Plantars are downgoing. Cranial nerves normal.  PSYCH:  Unremarkable. LABORATORY DATA:  In the ER, reveal a CBC with white count of 11.6, hemoglobin of 17.3, hematocrit is 49.4, MCV is 82.6, platelet count is [phone]. Chemistry:  Sodium 127, potassium 4.2, chloride is 90, bicarb is 23, anion cap of 16, glucose is 668. BUN is 19, creatinine is 1.92, calcium is 10.4, bilirubin is 1, protein is 9.7, albumin is 5.1, globulin is 4.6, ALT is 31, AST 14, alk phos is 105. VBG showed a pH of 7.32 with pCO2 of 40, and pO2 of 39. His ionized calcium is 1.16. Imaging has not been done. ASSESSMENT AND PLAN:    The patient is a 81-year-old gentleman with previous history of untreated hypertension. He has been admitted with:    1. Hyperglycemia with  mild diabetic ketoacidosis. The patient will be started on insulin drip, which hopefully can be discontinued soon once anion gap is closed. 2.  Dehydration. The patient will get IV fluids. We will monitor his renal function. 3.  Pseudohyponatremia due to hyperglycemia. We will monitor. 4.  Hypertension, uncontrolled. 5.  The patient is on no blood pressure medication. He will be started on Norvasc once his kidney function comes down, he can be started on lisinopril. 6.  Check frequent blood sugars. 7.  Check glycohemoglobin. 8.  Consult and diabetic counseling. 9. DVT prophylaxis.       MD OCTAVIO Foreman/[email or web address]  D:  02/16/2020 14:15  T:  02/16/2020 19:44  JOB #:  [id number]

## 2020-02-18 VITALS
WEIGHT: 229.5 LBS | BODY MASS INDEX: 38.24 KG/M2 | HEART RATE: 105 BPM | DIASTOLIC BLOOD PRESSURE: 78 MMHG | SYSTOLIC BLOOD PRESSURE: 129 MMHG | OXYGEN SATURATION: 99 % | RESPIRATION RATE: 18 BRPM | HEIGHT: 65 IN | TEMPERATURE: 98.2 F

## 2020-02-18 LAB
GLUCOSE BLD STRIP.AUTO-MCNC: 167 MG/DL (ref 65–100)
GLUCOSE BLD STRIP.AUTO-MCNC: 224 MG/DL (ref 65–100)
SERVICE CMNT-IMP: ABNORMAL
SERVICE CMNT-IMP: ABNORMAL

## 2020-02-18 PROCEDURE — 74011250637 HC RX REV CODE- 250/637: Performed by: HOSPITALIST

## 2020-02-18 PROCEDURE — 74011636637 HC RX REV CODE- 636/637: Performed by: INTERNAL MEDICINE

## 2020-02-18 PROCEDURE — 99218 HC RM OBSERVATION: CPT

## 2020-02-18 PROCEDURE — 82962 GLUCOSE BLOOD TEST: CPT

## 2020-02-18 RX ORDER — INSULIN LISPRO 100 [IU]/ML
5 INJECTION, SOLUTION INTRAVENOUS; SUBCUTANEOUS
Qty: 1 PACKAGE | Refills: 1 | Status: SHIPPED | OUTPATIENT
Start: 2020-02-18 | End: 2020-04-22

## 2020-02-18 RX ORDER — PEN NEEDLE, DIABETIC 31 GX3/16"
NEEDLE, DISPOSABLE MISCELLANEOUS
Qty: 200 PEN NEEDLE | Refills: 2 | Status: SHIPPED | OUTPATIENT
Start: 2020-02-18 | End: 2021-08-09 | Stop reason: CLARIF

## 2020-02-18 RX ORDER — METFORMIN HYDROCHLORIDE 500 MG/1
500 TABLET ORAL 2 TIMES DAILY WITH MEALS
Qty: 60 TAB | Refills: 1 | Status: SHIPPED | OUTPATIENT
Start: 2020-02-18 | End: 2020-03-25 | Stop reason: SINTOL

## 2020-02-18 RX ORDER — INSULIN GLARGINE 100 [IU]/ML
30 INJECTION, SOLUTION SUBCUTANEOUS
Qty: 9 ML | Refills: 2 | Status: SHIPPED | OUTPATIENT
Start: 2020-02-18 | End: 2020-04-22 | Stop reason: SDUPTHER

## 2020-02-18 RX ORDER — AMLODIPINE BESYLATE 5 MG/1
5 TABLET ORAL DAILY
Qty: 30 TAB | Refills: 1 | Status: SHIPPED | OUTPATIENT
Start: 2020-02-19 | End: 2020-04-19

## 2020-02-18 RX ADMIN — AMLODIPINE BESYLATE 5 MG: 5 TABLET ORAL at 08:51

## 2020-02-18 RX ADMIN — INSULIN LISPRO 5 UNITS: 100 INJECTION, SOLUTION INTRAVENOUS; SUBCUTANEOUS at 08:52

## 2020-02-18 RX ADMIN — INSULIN LISPRO 3 UNITS: 100 INJECTION, SOLUTION INTRAVENOUS; SUBCUTANEOUS at 08:51

## 2020-02-18 RX ADMIN — INSULIN LISPRO 2 UNITS: 100 INJECTION, SOLUTION INTRAVENOUS; SUBCUTANEOUS at 12:23

## 2020-02-18 RX ADMIN — Medication 10 ML: at 06:34

## 2020-02-18 RX ADMIN — INSULIN LISPRO 5 UNITS: 100 INJECTION, SOLUTION INTRAVENOUS; SUBCUTANEOUS at 12:24

## 2020-02-18 NOTE — DISCHARGE INSTRUCTIONS
HOSPITALIST DISCHARGE INSTRUCTIONS    NAME: Praneeth Mcneill   :  1980   MRN:  [id number]     Date/Time:  2020 12:15 PM    ADMIT DATE: 2020   DISCHARGE DATE: 2020         · It is important that you take the medication exactly as they are prescribed. · Keep your medication in the bottles provided by the pharmacist and keep a list of the medication names, dosages, and times to be taken in your wallet. · Do not take other medications without consulting your doctor. What to do at Home    Recommended diet:  Diabetic Diet    Recommended activity: Activity as tolerated      If you have questions regarding the hospital related prescriptions or hospital related issues please call [facility] at [phone]. You can always direct your questions to your primary care doctor if you are unable to reach your hospital physician; your PCP works as an extension of your hospital doctor just like your hospital doctor is an extension of your PCP for your time at the hospital [facility], [facility])    If you experience any of the following symptoms then please call your primary care physician or return to the emergency room if you cannot get hold of your doctor:    Fever, chills, nausea, vomiting, or persistent diarrhea  Worsening weakness or new problems with your speech or balance  Dark stools or visible blood in your stools  New Leg swelling or shortness of breath as these could be signs of a clot    Additional Instructions:      Bring these papers with you to your follow up appointments. The papers will help your doctors be sure to continue the care plan from the hospital.              Information obtained by :  I understand that if any problems occur once I am at home I am to contact my physician. I understand and acknowledge receipt of the instructions indicated above. Physician's or R.N.'s Signature                                                                  Date/Time                                                                                                                                              Patient or Representative Signature

## 2020-02-18 NOTE — PROGRESS NOTES
JAYLAN:   1) Home with f.u appts   2) Home with [facility]   3) Home with [facility] information     1:34 PM- [facility] will see pt for [facility] after pt sees his new PCP. Pt provided with [employer] flyer. All f.u appts on AVS, pt's fiance in the room and able to transport home. Pt is cleared to d/c from CM perspective, RN informed. 12:43 AM- D/C order noted. CM sent off for [facility] visit, waiting on response. CM also sent off to CM specialist to arrange new pt PCP appt per pt request.     Care Management Interventions  PCP Verified by CM: Yes  Mode of Transport at Discharge:  Other (see comment)  Transition of Care Consult (CM Consult): Discharge Planning  Current Support Network: Relative's Home  Confirm Follow Up Transport: Family  The Plan for Transition of Care is Related to the Following Treatment Goals : home   Discharge Location  Discharge Placement: [address], MSW, [address]    [phone]

## 2020-02-18 NOTE — ROUTINE PROCESS
The following appointments have been successfully scheduled: 
 
Date/time Friday, February 21, 2020 08:30 AM 
Patient  Tish Law 1980 ([id number] F) #[id number] S#[id number] Department [facility] SYSTEM OFFICE [address] Appointment type New Patient Provider Suellyn Collet

## 2020-02-18 NOTE — DISCHARGE SUMMARY
Hospitalist Discharge Summary     Patient ID:  Amada Kang  [id number]  46 y.o.  1980    PCP on record: None    Admit date: 2/16/2020  Discharge date and time: 2/18/2020      DISCHARGE DIAGNOSIS:    DM II, uncontrolled with hyperglycemia  Mild DKA, resolved  Volume depletion from osmotic diuresis   HTN    CONSULTATIONS:  None    Excerpted HPI from H&P of Erma Garcia MD:  Sent from [facility] due to high blood sugar.     HISTORY OF PRESENTING ILLNESS:       The patient is a 27-year-old -American gentleman who has previous history significant for hypertension. Currently, he does not take any medication, presented to [facility] due to polyuria, polydipsia for the last two weeks. The patient was also complaining of nausea and vomiting. At [facility], he was found to be hyperglycemic with blood sugar around 800. The patient was subsequently transferred to the emergency room. In the emergency room, his blood sugar was 668. He was given 3 L of IV fluids, 10 units of IV insulin. The patient denies having any chest pain or shortness of breath.    ______________________________________________________________________  DISCHARGE SUMMARY/HOSPITAL COURSE:  for full details see H&P, daily progress notes, labs, consult notes. DM II, uncontrolled with hyperglycemia  Mild DKA, resolved  Volume depletion from osmotic diuresis   -A1c 11.8% on admission  -family hx of DM  -was able to transition off insulin drip to Lantus + premeal humalog  -added metformin BID  -DM education provided  -he has glucose toxicity and needs to go home with insulin. Explained to patient that at some point, he probably can transition to oral agents.    He will watch his diet and will get him a follow up appt with a new PCP.       HTN  -better with norvasc 5 mg daily  -follow up with PCP in 1-2 weeks for BP check    _______________________________________________________________________  Patient seen and examined by me on discharge day. Pertinent Findings:  Gen:    Not in distress  Chest: Clear lungs  CVS:   Regular rhythm. No edema  Abd:  Soft, not distended, not tender  Neuro:  Alert, Oriented x 4, grossly non focal exam  _______________________________________________________________________  DISCHARGE MEDICATIONS:   Current Discharge Medication List      START taking these medications    Details   insulin glargine (LANTUS SOLOSTAR U-100 INSULIN) 100 unit/mL (3 mL) inpn 30 Units by SubCUTAneous route nightly for 90 days. Qty: 9 mL, Refills: 2      insulin lispro (HUMALOG) 100 unit/mL kwikpen 5 Units by SubCUTAneous route Before breakfast, lunch, and dinner. Qty: 1 Package, Refills: 1      metFORMIN (GLUCOPHAGE) 500 mg tablet Take 1 Tab by mouth two (2) times daily (with meals) for 60 days. Qty: 60 Tab, Refills: 1      amLODIPine (NORVASC) 5 mg tablet Take 1 Tab by mouth daily for 60 days. Qty: 30 Tab, Refills: 1             My Recommended Diet, Activity, Wound Care, and follow-up labs are listed in the patient's Discharge Insturctions which I have personally completed and reviewed.   Risk of deterioration: Low    Condition at Discharge:  Stable  _____________________________________________________________________    Disposition  Home with family, no needs  ____________________________________________________________________    Care Plan discussed with:   Patient, Family, RN, Care Manager    ____________________________________________________________________    Code Status: Full Code  ____________________________________________________________________      Condition at Discharge:  Stable  _____________________________________________________________________  Follow up with:   PCP : None  Follow-up Information     Follow up With Specialties Details Why Contact Info    None    None ([phone]) Patient stated that they have no PCP      Latoya Gurrola NP Family Practice In 1 week  [id number] [phone]  [phone]                Total time in minutes spent coordinating this discharge (includes going over instructions, follow-up, prescriptions, and preparing report for sign off to her PCP) :  35 minutes    Signed:  Connie Gray MD

## 2020-02-18 NOTE — INTERDISCIPLINARY ROUNDS
Oncology Interdisciplinary rounds were held today to discuss patient plan of care and outcomes. The following members were present: Nursing, Physician, Case Management, Pharmacy, and PT/OT Actual Length of Stay: 2 DRG GLOS: 2.9 Expected Length of Stay: 2d 21h Plan            Discharge Possible discharge today. 2/18/2020. Home Health Date: possible 2/18/2020. Home with family.

## 2020-02-18 NOTE — PROGRESS NOTES
[address]            2/18/2020      RE: Kristin Finnegan (YOB: 1980)    To Whom it May Concern:    Please excuse Alexandro Vargas from work from 2/16/2020 to 2/18/2020 as he was admitted to [facility] for a medical condition and has been under my care. He is advised to rest for a few more days and may return to work on 2/20/2020 with no new restrictions. Please feel free to contact my office if you have any questions or concerns. Thank you for your assistance in this matter.         Toro Harper MD  [phone] office

## 2020-02-18 NOTE — PROGRESS NOTES
Oncology End of Shift Note      Bedside shift change report given to [facility] OSIRIS VILLA (incoming nurse) by Iman Phillips (outgoing nurse) on [address]. Report included the following information SBAR, Kardex, Intake/Output and MAR. Shift Summary: Agree with care provided and documented by [place] Student Nurse. Pt remained stable during shift. No complaints. No lab work ordered this am. Pt HR in low 100s       Issues for Physician to Address:       Patient on Cardiac Monitoring?     [] Yes  [x] No    Rhythm:          Iman Phillips

## 2020-02-18 NOTE — DIABETES MGMT
XIOMY BHATTI  CLINICAL NURSE SPECIALIST CONSULT  [facility]    Presentation   Alexandro Salcedo is a 46 y.o. male admitted from ER in mild DKA. BG initially in 800s. AG 16. Hydration & insulin therapy via Bina Jb. Transitioned to 30 units of Lantus insulin. A1c 11.9%. Obviously glucose toxic. Doubt patient will require insulin in the long-term. Current clinical course has been uncomplicated.      Patient has newly diagnosed Type 2 diabetes. Strong family hx of diabetes in father, maternal grandmother and many cousins. He went to [facility] 3 weeks ago. No blood drawn per girlfriend. Patient reports not feeling right which prompted him to seek help again. C/o of polyuria, polydipsia and blurred vision. This diagnosis took him by surprise and he plans on \"doing whatever because I don't want to do this again. \"     Subjective   I supposed to go home today.     Objective   Physical exam  General Alert, oriented and in no acute distress. Conversant and cooperative. Sitting at bedside.  No appetite for breakfast.  Vital Signs   Visit Vitals  /78 (BP 1 Location: Left arm, BP Patient Position: At rest)   Pulse (!) 105   Temp 98.2 °F (36.8 °C)   Resp 18   Ht 5' 5\" (1.651 m)   Wt 104.1 kg (229 lb 8 oz)   SpO2 99%   BMI 38.19 kg/m²     Laboratory  Lab Results   Component Value Date/Time    Hemoglobin A1c 11.9 (H) 02/17/2020 04:29 AM     No results found for: LDL, LDLC, DLDLP  Lab Results   Component Value Date/Time    Creatinine 1.20 02/17/2020 04:29 AM     Lab Results   Component Value Date/Time    Sodium 134 (L) 02/17/2020 04:29 AM    Potassium 4.3 02/17/2020 04:29 AM    Chloride 103 02/17/2020 04:29 AM    CO2 23 02/17/2020 04:29 AM    Anion gap 8 02/17/2020 04:29 AM    Glucose 274 (H) 02/17/2020 04:29 AM    BUN 12 02/17/2020 04:29 AM    Creatinine 1.20 02/17/2020 04:29 AM    BUN/Creatinine ratio 10 (L) 02/17/2020 04:29 AM    GFR est AA >60 02/17/2020 04:29 AM    GFR est non-AA >60 02/17/2020 04:29 AM    Calcium 9.2 02/17/2020 04:29 AM    Bilirubin, total 1.0 02/16/2020 12:51 PM    AST (SGOT) 14 (L) 02/16/2020 12:51 PM    Alk. phosphatase 105 02/16/2020 12:51 PM    Protein, total 9.7 (H) 02/16/2020 12:51 PM    Albumin 5.1 (H) 02/16/2020 12:51 PM    Globulin 4.6 (H) 02/16/2020 12:51 PM    A-G Ratio 1.1 02/16/2020 12:51 PM    ALT (SGPT) 31 02/16/2020 12:51 PM     Lab Results   Component Value Date/Time    ALT (SGPT) 31 02/16/2020 12:51 PM       Blood glucose pattern        Assessment and Plan   Nursing Diagnosis Risk for unstable blood glucose pattern   Nursing Intervention Domain 0556 Decision-making Support   Nursing Interventions Examined current inpatient diabetes control   Informed patient of rational for insulin strategy while hospitalized  Instructed girlfriend in insulin injection technique  Instructed patient and girlfriend in insulin injection via insulin pen device  Instructed in Healthy Plate principles using food models  Given sample meal plan  Given handout on portion control     Evaluation   This gentleman, with newly diagnsoedType 2 diabetes, hasn't achieved inpatient blood glucose target of 100-180mg/dl. Patient may require insulin until insulin sensitizing agent (metformin ER) is at full dosing. Girlfriend gave insulin injection into left upper arm without difficulty. Patient injected body model using insulin pen device without difficulty. Patient understands where insulin should be injected and how to dispose of needles. Recommendations   Discharge recommendations:    Establish PCP relationship    BG monitoring QAM with [employer] Prime meter system (from [employer])-BG range     Start metformin ER therapy and titrate to full dosing      Agree with Basal insulin for now at  [x]?        0.3 units/kg/D      Referral   [x]?         Diabetes Self-Management Training through [facility] (Phone [phone] to schedule appointment)      Billing Code(s) [x] 21  IP subsequent hospital care - 45 minutes      Linus Calles, CNS  Access via R [address] [phone]

## 2020-02-21 NOTE — ADT AUTH CERT NOTES
DOWNGRADED TO OBSERVATION 
 
ONCE RECEIVED AND COMPLETED, PLEASE FAX BACK CONFIRMATION AND NEW OBS AUTH# OR WHETHER OR NOT OBS REQUIRES AN AUTH.  
 
Hermelindo Uribe

## 2020-02-25 ENCOUNTER — OFFICE VISIT (OUTPATIENT)
Dept: INTERNAL MEDICINE CLINIC | Age: 40
End: 2020-02-25

## 2020-02-25 ENCOUNTER — HOME HEALTH ADMISSION (OUTPATIENT)
Dept: HOME HEALTH SERVICES | Facility: HOME HEALTH | Age: 40
End: 2020-02-25

## 2020-02-25 VITALS
DIASTOLIC BLOOD PRESSURE: 70 MMHG | SYSTOLIC BLOOD PRESSURE: 120 MMHG | OXYGEN SATURATION: 99 % | HEART RATE: 92 BPM | BODY MASS INDEX: 38.31 KG/M2 | HEIGHT: 66 IN | TEMPERATURE: 98.4 F | WEIGHT: 238.4 LBS | RESPIRATION RATE: 16 BRPM

## 2020-02-25 DIAGNOSIS — Z78.9 EX-SMOKER FOR LESS THAN 1 YEAR: ICD-10-CM

## 2020-02-25 DIAGNOSIS — I10 ESSENTIAL HYPERTENSION: ICD-10-CM

## 2020-02-25 DIAGNOSIS — E66.9 OBESITY (BMI 30-39.9): ICD-10-CM

## 2020-02-25 DIAGNOSIS — J45.20 MILD INTERMITTENT ASTHMA WITHOUT COMPLICATION: ICD-10-CM

## 2020-02-25 DIAGNOSIS — E11.65 TYPE 2 DIABETES MELLITUS WITH HYPERGLYCEMIA, WITHOUT LONG-TERM CURRENT USE OF INSULIN (HCC): Primary | ICD-10-CM

## 2020-02-25 DIAGNOSIS — E66.01 SEVERE OBESITY (HCC): ICD-10-CM

## 2020-02-25 RX ORDER — AMLODIPINE BESYLATE 5 MG/1
TABLET ORAL
COMMUNITY
Start: 2020-02-18 | End: 2020-02-25 | Stop reason: SDUPTHER

## 2020-02-25 RX ORDER — PEN NEEDLE, DIABETIC 32GX 5/32"
NEEDLE, DISPOSABLE MISCELLANEOUS
COMMUNITY
Start: 2020-02-18 | End: 2020-02-25 | Stop reason: SDUPTHER

## 2020-02-25 RX ORDER — INSULIN GLARGINE 100 [IU]/ML
INJECTION, SOLUTION SUBCUTANEOUS
COMMUNITY
Start: 2020-02-19 | End: 2020-02-25 | Stop reason: SDUPTHER

## 2020-02-25 RX ORDER — LANCETS
EACH MISCELLANEOUS
Qty: 100 EACH | Refills: 3 | Status: SHIPPED | OUTPATIENT
Start: 2020-02-25 | End: 2020-03-25 | Stop reason: ALTCHOICE

## 2020-02-25 RX ORDER — AMLODIPINE BESYLATE 5 MG/1
5 TABLET ORAL DAILY
Qty: 30 TAB | Refills: 3 | Status: SHIPPED | OUTPATIENT
Start: 2020-02-25 | End: 2020-07-31

## 2020-02-25 RX ORDER — INSULIN LISPRO 100 [IU]/ML
INJECTION, SOLUTION INTRAVENOUS; SUBCUTANEOUS
COMMUNITY
Start: 2020-02-18 | End: 2020-04-22

## 2020-02-25 RX ORDER — INSULIN GLARGINE 100 [IU]/ML
34 INJECTION, SOLUTION SUBCUTANEOUS DAILY
Qty: 5 ADJUSTABLE DOSE PRE-FILLED PEN SYRINGE | Refills: 3 | Status: SHIPPED | OUTPATIENT
Start: 2020-02-25 | End: 2020-04-22

## 2020-02-25 RX ORDER — ALBUTEROL SULFATE 90 UG/1
2 AEROSOL, METERED RESPIRATORY (INHALATION)
Qty: 1 INHALER | Refills: 3 | Status: SHIPPED | OUTPATIENT
Start: 2020-02-25 | End: 2021-07-20

## 2020-02-25 RX ORDER — PEN NEEDLE, DIABETIC 32GX 5/32"
NEEDLE, DISPOSABLE MISCELLANEOUS
Qty: 100 PEN NEEDLE | Refills: 3 | Status: SHIPPED | OUTPATIENT
Start: 2020-02-25 | End: 2020-06-24

## 2020-02-25 RX ORDER — METFORMIN HYDROCHLORIDE 500 MG/1
TABLET ORAL
COMMUNITY
Start: 2020-02-18 | End: 2020-02-25 | Stop reason: SDUPTHER

## 2020-02-25 RX ORDER — METFORMIN HYDROCHLORIDE 500 MG/1
500 TABLET ORAL 2 TIMES DAILY WITH MEALS
Qty: 180 TAB | Refills: 3 | Status: SHIPPED | OUTPATIENT
Start: 2020-02-25 | End: 2020-03-23

## 2020-02-25 RX ORDER — INSULIN PUMP SYRINGE, 3 ML
EACH MISCELLANEOUS
Qty: 1 KIT | Refills: 0 | Status: SHIPPED | OUTPATIENT
Start: 2020-02-25 | End: 2020-03-25 | Stop reason: SDUPTHER

## 2020-02-25 NOTE — PROGRESS NOTES
Rm 9    Chief Complaint   Patient presents with   [facility]     ongoing for a while   pt is not fasting      1. Have you been to the ER, urgent care clinic since your last visit? Hospitalized since your last visit? ED, 2/16/20,Hyperglycemia    2. Have you seen or consulted any other health care providers outside of the [address] [place] [address] since your last visit? Include any pap smears or colon screening.  No        Health Maintenance Due   Topic Date Due    DTaP/Tdap/Td series (1 - Tdap) 12/28/1992    PAP AKA CERVICAL CYTOLOGY  12/28/2002    Influenza Age 5 to Adult  08/01/2019   flu vaccine refused        3 most recent PHQ Screens 2/25/2020   Little interest or pleasure in doing things Not at all   Feeling down, depressed, irritable, or hopeless Not at all   Total Score PHQ 2 0           Learning Assessment 2/25/2020   PRIMARY LEARNER Patient   HIGHEST LEVEL OF EDUCATION - PRIMARY LEARNER  DID NOT GRADUATE HIGH SCHOOL   PRIMARY LANGUAGE ENGLISH   LEARNER PREFERENCE PRIMARY READING     LISTENING     DEMONSTRATION     VIDEOS   ANSWERED BY patient   RELATIONSHIP SELF

## 2020-02-25 NOTE — PATIENT INSTRUCTIONS
Asthma in Adults: Care Instructions  Your Care Instructions    During an asthma attack, your airways swell and narrow as a reaction to certain things (triggers). This makes it hard to breathe. You may be able to prevent asthma attacks if you avoid the things that set off your asthma symptoms. Keeping your asthma under control and treating symptoms before they get bad can help you avoid severe attacks. If you can control your asthma, you may be able to do all of your normal daily activities. You may also avoid asthma attacks and trips to the hospital.  Follow-up care is a key part of your treatment and safety. Be sure to make and go to all appointments, and call your doctor if you are having problems. It's also a good idea to know your test results and keep a list of the medicines you take. How can you care for yourself at home? · Follow your asthma action plan so you can manage your symptoms at home. An asthma action plan will help you prevent and control airway reactions and will tell you what to do during an asthma attack. If you do not have an asthma action plan, work with your doctor to build one. · Take your asthma medicine exactly as prescribed. Medicine plays an important role in controlling asthma. Talk to your doctor right away if you have any questions about what to take and how to take it. ? Use your quick-relief medicine when you have symptoms of an attack. Quick-relief medicine often is an albuterol inhaler. Some people need to use quick-relief medicine before they exercise. ? Take your controller medicine every day, not just when you have symptoms. Controller medicine is usually an inhaled corticosteroid. The goal is to prevent problems before they occur. Do not use your controller medicine to try to treat an attack that has already started. It does not work fast enough to help. ? If your doctor prescribed corticosteroid pills to use during an attack, take them as directed.  They may take hours to work, but they may shorten the attack and help you breathe better. ? Keep your quick-relief medicine with you at all times. · Talk to your doctor before using other medicines. Some medicines, such as aspirin, can cause asthma attacks in some people. · Check yourself for asthma symptoms to know which step to follow in your action plan. Watch for things like being short of breath, having chest tightness, coughing, and wheezing. Also notice if symptoms wake you up at night or if you get tired quickly when you exercise. · If you have a peak flow meter, use it to check how well you are breathing. This can help you predict when an asthma attack is going to occur. Then you can take medicine to prevent the asthma attack or make it less severe. · See your doctor regularly. These visits will help you learn more about asthma and what you can do to control it. Your doctor will monitor your treatment to make sure the medicine is helping you. · Keep track of your asthma attacks and your treatment. After you have had an attack, write down what triggered it, what helped end it, and any concerns you have about your asthma action plan. Take your diary when you see your doctor. You can then review your asthma action plan and decide if it is working. · Do not smoke or allow others to smoke around you. Avoid smoky places. Smoking makes asthma worse. If you need help quitting, talk to your doctor about stop-smoking programs and medicines. These can increase your chances of quitting for good. · Learn what triggers an asthma attack for you, and avoid the triggers when you can. Common triggers include colds, smoke, air pollution, dust, pollen, mold, pets, cockroaches, stress, and cold air. · Avoid colds and the flu. Get a pneumococcal vaccine shot. If you have had one before, ask your doctor whether you need a second dose. Get a flu vaccine every fall.  If you must be around people with colds or the flu, wash your hands often.  When should you call for help? Call 911 anytime you think you may need emergency care. For example, call if:    · You have severe trouble breathing.    Call your doctor now or seek immediate medical care if:    · Your symptoms do not get better after you have followed your asthma action plan.     · You cough up yellow, dark brown, or bloody mucus (sputum).    Watch closely for changes in your health, and be sure to contact your doctor if:    · Your coughing and wheezing get worse.     · You need to use quick-relief medicine on more than 2 days a week (unless it is just for exercise).     · You need help figuring out what is triggering your asthma attacks. Where can you learn more? Go to http://bro-estefani.info/. Enter P597 in the search box to learn more about \"Asthma in Adults: Care Instructions. \"  Current as of: June 9, 2019  Content Version: 12.2  © 8107-7495 [employer]. Care instructions adapted under license by "[employer] (which disclaims liability or warranty for this information). If you have questions about a medical condition or this instruction, always ask your healthcare professional. [place] [address] any warranty or liability for your use of this information. Learning About Tests When You Have Diabetes  Why do you need regular diabetes tests? Diabetes can be hard on your body if it's not well controlled. But having tests on a regular schedule can help you and your doctor find problems early, when it's easier to start managing them. What tests do you need? The tests you may have, how often you should have them, and the goals of the tests are:  A1c blood test. This test shows the average level of blood sugar over the past 2 to 3 months. It helps your doctor see whether blood sugar levels have been staying within your target range.   · How often: Every 3 to 6 months  · Goal: A blood sugar level in your target range  Blood pressure test: This test measures the pressure of blood flow in the arteries. Controlling blood pressure can help prevent damage to nerves and blood vessels. · How often: Every 3 to 6 months  · Goal: A blood pressure level in your target range  Cholesterol test: This test measures the amount of a type of fat in the blood. It is common for people with diabetes to also have high cholesterol. Too much cholesterol in the blood can build up inside the blood vessels and raise the risk for heart attack and stroke. · How often: At the time of your diabetes diagnosis, and as often as your doctor recommends after that  · Goal: A cholesterol level in your target range  Albumin-creatinine ratio test: This test checks for kidney damage by looking for the protein albumin (say \"al-BYOO-delicia\") in the urine. Albumin is normally found in the blood. Kidney damage can let small amounts of it (microalbumin) leak into the urine. · How often: Once a year  · Goal: No protein in the urine  Blood creatinine test/estimated glomerular filtration (eGFR): The blood creatinine (say \"hjlm-YN-ot-neen\") level shows how well your kidneys are working. Creatinine is a waste product that muscles release into the blood. Blood creatinine is used to estimate the glomerular filtration rate. A high level of creatinine and/or a low eGFR may mean your kidneys are not working as well as they should. · How often: Once a year  · Goal: Normal level of creatinine in the blood. The eGFR goal is greater than 60 mL/min/1.73 m². Complete foot exam: The doctor checks for foot sores and whether any sensation has been lost.  · How often: Once a year  · Goal: Healthy feet with no foot ulcers or loss of feeling  Dental exam and cleaning: The dentist checks for gum disease and tooth decay. People with high blood sugar are more likely to have these problems.   · How often: Every 6 months  · Goal: Healthy teeth and gums  Complete eye exam: High blood sugar levels can damage the eyes. This exam is done by an ophthalmologist or optometrist. It includes a dilated eye exam. The exam shows whether there's damage to the back of the eye (diabetic retinopathy). · How often: Once a year. If you don't have any signs of diabetic retinopathy, your doctor may recommend an exam every 2 years. · Goal: No damage to the back of the eye  Thyroid-stimulating hormone (TSH) blood test: This test checks for thyroid disease. Too little thyroid hormone can cause some medicines (like insulin) to stay in the body longer. This can cause low blood sugar. You may be tested if you have high cholesterol or are a woman over 48years old. · How often: As part of your diabetes diagnosis, and as often as your doctor recommends after that  · Goal: Normal level of TSH in the blood  Follow-up care is a key part of your treatment and safety. Be sure to make and go to all appointments, and call your doctor if you are having problems. It's also a good idea to know your test results and keep a list of the medicines you take. Where can you learn more? Go to http://bro-estefani.info/. Enter [id number] in the search box to learn more about \"Learning About Tests When You Have Diabetes. \"  Current as of: April 16, 2019  Content Version: 12.2  © 3634-9566 [employer]. Care instructions adapted under license by [employer] (which disclaims liability or warranty for this information). If you have questions about a medical condition or this instruction, always ask your healthcare professional. [place] [address] any warranty or liability for your use of this information. Learning About Metformin for Type 2 Diabetes  Introduction    Metformin (such as Glucophage) is a medicine used to treat type 2 diabetes. It helps keep blood sugar levels on target.   You may have tried to eat a healthy diet, lose weight, and get more exercise to keep your blood sugar in your target range. If those things do not help, you may take a medicine called metformin. It helps your body use insulin. This can help you control your blood sugar. You might take it on its own or with other medicines. When taken on its own, metformin should not cause low blood sugar or weight gain. Example  · Metformin (Glucophage)  Possible side effects  Common side effects include:  · Short-term nausea. · Not feeling hungry. · Diarrhea. · Increased gas in your belly. · A metallic taste. You may have side effects or reactions not listed here. Check the information that comes with your medicine. What to know about taking this medicine  · Metformin does not usually cause low blood sugar. But you may get a low blood sugar when you take metformin and you exercise hard, drink alcohol, or you do not eat enough food. · Sometimes metformin is combined with other diabetes medicine. Some of these can cause low blood sugar. · If you need a test that uses a dye or you need to have surgery, be sure to tell all of your doctors that you take metformin. You may have to stop taking it before and after the test or surgery. · Over time, blood levels of vitamin B12 can decrease in some people who take metformin. Your body needs this B vitamin to make blood cells. It also keeps your nervous system healthy. If you have been taking metformin for more than a few years, ask your doctor if you need a B12 blood test to measure the amount of vitamin B12 in your blood. · Be safe with medicines. Take your medicines exactly as prescribed. Call your doctor if you think you are having a problem with your medicine. · Check with your doctor or pharmacist before you use any other medicines. This includes over-the-counter medicines. Make sure your doctor knows all of the medicines, vitamins, herbal products, and supplements you take. Taking some medicines together can cause problems. Where can you learn more?   Go to http://bro-estefani.info/. Enter Selina Marks in the search box to learn more about \"Learning About Metformin for Type 2 Diabetes. \"  Current as of: April 16, 2019  Content Version: 12.2  © 6342-3638 [employer]. Care instructions adapted under license by [employer] (which disclaims liability or warranty for this information). If you have questions about a medical condition or this instruction, always ask your healthcare professional. [address] any warranty or liability for your use of this information. DASH Diet: Care Instructions  Your Care Instructions    The DASH diet is an eating plan that can help lower your blood pressure. DASH stands for Dietary Approaches to Stop Hypertension. Hypertension is high blood pressure. The DASH diet focuses on eating foods that are high in calcium, potassium, and magnesium. These nutrients can lower blood pressure. The foods that are highest in these nutrients are fruits, vegetables, low-fat dairy products, nuts, seeds, and legumes. But taking calcium, potassium, and magnesium supplements instead of eating foods that are high in those nutrients does not have the same effect. The DASH diet also includes whole grains, fish, and poultry. The DASH diet is one of several lifestyle changes your doctor may recommend to lower your high blood pressure. Your doctor may also want you to decrease the amount of sodium in your diet. Lowering sodium while following the DASH diet can lower blood pressure even further than just the DASH diet alone. Follow-up care is a key part of your treatment and safety. Be sure to make and go to all appointments, and call your doctor if you are having problems. It's also a good idea to know your test results and keep a list of the medicines you take. How can you care for yourself at home? Following the DASH diet  · Eat 4 to 5 servings of fruit each day.  A serving is 1 medium-sized piece of fruit, ½ cup chopped or canned fruit, 1/4 cup dried fruit, or 4 ounces (½ cup) of fruit juice. Choose fruit more often than fruit juice. · Eat 4 to 5 servings of vegetables each day. A serving is 1 cup of lettuce or raw leafy vegetables, ½ cup of chopped or cooked vegetables, or 4 ounces (½ cup) of vegetable juice. Choose vegetables more often than vegetable juice. · Get 2 to 3 servings of low-fat and fat-free dairy each day. A serving is 8 ounces of milk, 1 cup of yogurt, or 1 ½ ounces of cheese. · Eat 6 to 8 servings of grains each day. A serving is 1 slice of bread, 1 ounce of dry cereal, or ½ cup of cooked rice, pasta, or cooked cereal. Try to choose whole-grain products as much as possible. · Limit lean meat, poultry, and fish to 2 servings each day. A serving is 3 ounces, about the size of a deck of cards. · Eat 4 to 5 servings of nuts, seeds, and legumes (cooked dried beans, lentils, and split peas) each week. A serving is 1/3 cup of nuts, 2 tablespoons of seeds, or ½ cup of cooked beans or peas. · Limit fats and oils to 2 to 3 servings each day. A serving is 1 teaspoon of vegetable oil or 2 tablespoons of salad dressing. · Limit sweets and added sugars to 5 servings or less a week. A serving is 1 tablespoon jelly or jam, ½ cup sorbet, or 1 cup of lemonade. · Eat less than 2,300 milligrams (mg) of sodium a day. If you limit your sodium to 1,500 mg a day, you can lower your blood pressure even more. Tips for success  · Start small. Do not try to make dramatic changes to your diet all at once. You might feel that you are missing out on your favorite foods and then be more likely to not follow the plan. Make small changes, and stick with them. Once those changes become habit, add a few more changes. · Try some of the following:  ? Make it a goal to eat a fruit or vegetable at every meal and at snacks. This will make it easy to get the recommended amount of fruits and vegetables each day.   ? Try yogurt topped with fruit and nuts for a snack or healthy dessert. ? Add lettuce, tomato, cucumber, and onion to sandwiches. ? Combine a ready-made pizza crust with low-fat mozzarella cheese and lots of vegetable toppings. Try using tomatoes, squash, spinach, broccoli, carrots, cauliflower, and onions. ? Have a variety of cut-up vegetables with a low-fat dip as an appetizer instead of chips and dip. ? Sprinkle sunflower seeds or chopped almonds over salads. Or try adding chopped walnuts or almonds to cooked vegetables. ? Try some vegetarian meals using beans and peas. Add garbanzo or kidney beans to salads. Make burritos and tacos with mashed muse beans or black beans. Where can you learn more? Go to http://broLockdown Networksestefani.info/. Enter F610 in the search box to learn more about \"DASH Diet: Care Instructions. \"  Current as of: April 9, 2019  Content Version: 12.2  © 2569-4131 [employer]. Care instructions adapted under license by [employer] (which disclaims liability or warranty for this information). If you have questions about a medical condition or this instruction, always ask your healthcare professional. [address] any warranty or liability for your use of this information. Learning About High Blood Pressure  What is high blood pressure? Blood pressure is a measure of how hard the blood pushes against the walls of your arteries. It's normal for blood pressure to go up and down throughout the day, but if it stays up, you have high blood pressure. Another name for high blood pressure is hypertension. Two numbers tell you your blood pressure. The first number is the systolic pressure. It shows how hard the blood pushes when your heart is pumping. The second number is the diastolic pressure. It shows how hard the blood pushes between heartbeats, when your heart is relaxed and filling with blood.   Your doctor will give you a goal for your blood pressure. Your goal will be based on your health and your age. High blood pressure (hypertension) means that the top number stays high, or the bottom number stays high, or both. High blood pressure increases the risk of stroke, heart attack, and other problems. You and your doctor will talk about your risks of these problems based on your blood pressure. What happens when you have high blood pressure? · Blood flows through your arteries with too much force. Over time, this damages the walls of your arteries. But you can't feel it. High blood pressure usually doesn't cause symptoms. · Fat and calcium start to build up in your arteries. This buildup is called plaque. Plaque makes your arteries narrower and stiffer. Blood can't flow through them as easily. · This lack of good blood flow starts to damage some of the organs in your body. This can lead to problems such as coronary artery disease and heart attack, heart failure, stroke, kidney failure, and eye damage. How can you prevent high blood pressure? · Stay at a healthy weight. · Try to limit how much sodium you eat to less than 2,300 milligrams (mg) a day. If you limit your sodium to 1,500 mg a day, you can lower your blood pressure even more. ? Buy foods that are labeled \"unsalted,\" \"sodium-free,\" or \"low-sodium. \" Foods labeled \"reduced-sodium\" and \"light sodium\" may still have too much sodium. ? Flavor your food with garlic, lemon juice, onion, vinegar, herbs, and spices instead of salt. Do not use soy sauce, steak sauce, onion salt, garlic salt, mustard, or ketchup on your food. ? Use less salt (or none) when recipes call for it. You can often use half the salt a recipe calls for without losing flavor. · Be physically active. Get at least 30 minutes of exercise on most days of the week. Walking is a good choice. You also may want to do other activities, such as running, swimming, cycling, or playing tennis or team sports.   · Limit alcohol to 2 drinks a day for men and 1 drink a day for women. · Eat plenty of fruits, vegetables, and low-fat dairy products. Eat less saturated and total fats. How is high blood pressure treated? · Your doctor will suggest making lifestyle changes to help your heart. For example, your doctor may ask you to eat healthy foods, quit smoking, lose extra weight, and be more active. · If lifestyle changes don't help enough, your doctor may recommend that you take medicine. · When blood pressure is very high, medicines are needed to lower it. Follow-up care is a key part of your treatment and safety. Be sure to make and go to all appointments, and call your doctor if you are having problems. It's also a good idea to know your test results and keep a list of the medicines you take. Where can you learn more? Go to http://bro-estefani.info/. Enter P501 in the search box to learn more about \"Learning About High Blood Pressure. \"  Current as of: April 9, 2019  Content Version: 12.2  © 9841-2064 [employer]. Care instructions adapted under license by [employer] (which disclaims liability or warranty for this information). If you have questions about a medical condition or this instruction, always ask your healthcare professional. [address] any warranty or liability for your use of this information. Nutrition Tips for Diabetes: After Your Visit  Your Care Instructions  A healthy diet is important to manage diabetes. It helps you lose weight (if you need to) and keep it off. It gives you the nutrition and energy your body needs and helps prevent heart disease. But a diet for diabetes does not mean that you have to eat special foods. You can eat what your family eats, including occasional sweets and other favorites. But you do have to pay attention to how often you eat and how much you eat of certain foods.  The right plan for you will give you meals that help you keep your blood sugar at healthy levels. Try to eat a variety of foods and to spread carbohydrate throughout the day. Carbohydrate raises blood sugar higher and more quickly than any other nutrient does. Carbohydrate is found in sugar, breads and cereals, fruit, starchy vegetables such as potatoes and corn, and milk and yogurt. You may want to work with a dietitian or diabetes educator to help you plan meals and snacks. A dietitian or diabetes educator also can help you lose weight if that is one of your goals. The following tips can help you enjoy your meals and stay healthy. Follow-up care is a key part of your treatment and safety. Be sure to make and go to all appointments, and call your doctor if you are having problems. Its also a good idea to know your test results and keep a list of the medicines you take. How can you care for yourself at home? · Learn which foods have carbohydrate and how much carbohydrate to eat. A dietitian or diabetes educator can help you learn to keep track of how much carbohydrate you eat. · Spread carbohydrate throughout the day. Eat some carbohydrate at all meals, but do not eat too much at any one time. · Plan meals to include food from all the food groups. These are the food groups and some example portion sizes:  ¨ Grains: 1 slice of bread (1 ounce), ½ cup of cooked cereal, and 1/3 cup of cooked pasta or rice. These have about 15 grams of carbohydrate in a serving. Choose whole grains such as whole wheat bread or crackers, oatmeal, and brown rice more often than refined grains. ¨ Fruit: 1 small fresh fruit, such as an apple or orange; ½ of a banana; ½ cup of chopped, cooked, or canned fruit; ½ cup of fruit juice; 1 cup of melon or raspberries; and 2 tablespoons of dried fruit. These have about 15 grams of carbohydrate in a serving. ¨ Dairy: 1 cup of nonfat or low-fat milk and 2/3 cup of plain yogurt.  These have about 15 grams of carbohydrate in a serving. ¨ Protein foods: Beef, chicken, turkey, fish, eggs, tofu, cheese, cottage cheese, and peanut butter. A serving size of meat is 3 ounces, which is about the size of a deck of cards. Examples of meat substitute serving sizes (equal to 1 ounce of meat) are 1/4 cup of cottage cheese, 1 egg, 1 tablespoon of peanut butter, and ½ cup of tofu. These have very little or no carbohydrate per serving. ¨ Vegetables: Starchy vegetables such as ½ cup of cooked dried beans, peas, potatoes, or corn have about 15 grams of carbohydrate. Nonstarchy vegetables have very little carbohydrate, such as 1 cup of raw leafy vegetables (such as spinach), ½ cup of other vegetables (cooked or chopped), and 3/4 cup of vegetable juice. · Use the plate format to plan meals. It is a good, quick way to make sure that you have a balanced meal. It also helps you spread carbohydrate throughout the day. You divide your plate by types of foods. Put vegetables on half the plate, meat or meat substitutes on one-quarter of the plate, and a grain or starchy vegetable (such as brown rice or a potato) in the final quarter of the plate. To this you can add a small piece of fruit and 1 cup of milk or yogurt, depending on how much carbohydrate you are supposed to eat at a meal.  · Talk to your dietitian or diabetes educator about ways to add limited amounts of sweets into your meal plan. You can eat these foods now and then, as long as you include the amount of carbohydrate they have in your daily carbohydrate allowance. · If you drink alcohol, limit it to no more than 1 drink a day for women and 2 drinks a day for men. If you are pregnant, no amount of alcohol is known to be safe. · Protein, fat, and fiber do not raise blood sugar as much as carbohydrate does. If you eat a lot of these nutrients in a meal, your blood sugar will rise more slowly than it would otherwise. · Limit saturated fats, such as those from meat and dairy products.  Try to replace it with monounsaturated fat, such as olive oil. This is a healthier choice because people who have diabetes are at higher-than-average risk of heart disease. But use a modest amount of olive oil. A tablespoon of olive oil has 14 grams of fat and 120 calories. · Exercise lowers blood sugar. If you take insulin by shots or pump, you can use less than you would if you were not exercising. Keep in mind that timing matters. If you exercise within 1 hour after a meal, your body may need less insulin for that meal than it would if you exercised 3 hours after the meal. Test your blood sugar to find out how exercise affects your need for insulin. · Exercise on most days of the week. Aim for at least 30 minutes. Exercise helps you stay at a healthy weight and helps your body use insulin. Walking is an easy way to get exercise. Gradually increase the amount you walk every day. You also may want to swim, bike, or do other activities. When you eat out  · Learn to estimate the serving sizes of foods that have carbohydrate. If you measure food at home, it will be easier to estimate the amount in a serving of restaurant food. · If the meal you order has too much carbohydrate (such as potatoes, corn, or baked beans), ask to have a low-carbohydrate food instead. Ask for a salad or green vegetables. · If you use insulin, check your blood sugar before and after eating out to help you plan how much to eat in the future. · If you eat more carbohydrate at a meal than you had planned, take a walk or do other exercise. This will help lower your blood sugar. Where can you learn more? Go to [employer]  Enter M617 in the search box to learn more about \"Nutrition Tips for Diabetes: After Your Visit. \"   © 2477-1984 [employer]. Care instructions adapted under license by [facility] [employer] (which disclaims liability or warranty for this information).  This care instruction is for use with your licensed healthcare professional. If you have questions about a medical condition or this instruction, always ask your healthcare professional. [address] any warranty or liability for your use of this information. Content Version: 98.1.449237; Current as of: June 4, 2014                 Learning About Meal Planning for Diabetes  Why plan your meals? Meal planning can be a key part of managing diabetes. Planning meals and snacks with the right balance of carbohydrate, protein, and fat can help you keep your blood sugar at the target level you set with your doctor. You don't have to eat special foods. You can eat what your family eats, including sweets once in a while. But you do have to pay attention to how often you eat and how much you eat of certain foods. You may want to work with a dietitian or a certified diabetes educator. He or she can give you tips and meal ideas and can answer your questions about meal planning. This health professional can also help you reach a healthy weight if that is one of your goals. What plan is right for you? Your dietitian or diabetes educator may suggest that you start with the plate format or carbohydrate counting. The plate format  The plate format is a simple way to help you manage how you eat. You plan meals by learning how much space each food should take on a plate. Using the plate format helps you spread carbohydrate throughout the day. It can make it easier to keep your blood sugar level within your target range. It also helps you see if you're eating healthy portion sizes. To use the plate format, you put non-starchy vegetables on half your plate. Add meat or meat substitutes on one-quarter of the plate. Put a grain or starchy vegetable (such as brown rice or a potato) on the final quarter of the plate.  You can add a small piece of fruit and some low-fat or fat-free milk or yogurt, depending on your carbohydrate goal for each meal.  Here are some tips for using the plate format:  · Make sure that you are not using an oversized plate. A 9-inch plate is best. Many restaurants use larger plates. · Get used to using the plate format at home. Then you can use it when you eat out. · Write down your questions about using the plate format. Talk to your doctor, a dietitian, or a diabetes educator about your concerns. Carbohydrate counting  With carbohydrate counting, you plan meals based on the amount of carbohydrate in each food. Carbohydrate raises blood sugar higher and more quickly than any other nutrient. It is found in desserts, breads and cereals, and fruit. It's also found in starchy vegetables such as potatoes and corn, grains such as rice and pasta, and milk and yogurt. Spreading carbohydrate throughout the day helps keep your blood sugar levels within your target range. Your daily amount depends on several things, including your weight, how active you are, which diabetes medicines you take, and what your goals are for your blood sugar levels. A registered dietitian or diabetes educator can help you plan how much carbohydrate to include in each meal and snack. A guideline for your daily amount of carbohydrate is:  · 45 to 60 grams at each meal. That's about the same as 3 to 4 carbohydrate servings. · 15 to 20 grams at each snack. That's about the same as 1 carbohydrate serving. The Nutrition Facts label on packaged foods tells you how much carbohydrate is in a serving of the food. First, look at the serving size on the food label. Is that the amount you eat in a serving? All of the nutrition information on a food label is based on that serving size. So if you eat more or less than that, you'll need to adjust the other numbers. Total carbohydrate is the next thing you need to look for on the label. If you count carbohydrate servings, one serving of carbohydrate is 15 grams.   For foods that don't come with labels, such as fresh fruits and vegetables, you'll need a guide that lists carbohydrate in these foods. Ask your doctor, dietitian, or diabetes educator about books or other nutrition guides you can use. If you take insulin, you need to know how many grams of carbohydrate are in a meal. This lets you know how much rapid-acting insulin to take before you eat. If you use an insulin pump, you get a constant rate of insulin during the day. So the pump must be programmed at meals to give you extra insulin to cover the rise in blood sugar after meals. When you know how much carbohydrate you will eat, you can take the right amount of insulin. Or, if you always use the same amount of insulin, you need to make sure that you eat the same amount of carbohydrate at meals. If you need more help to understand carbohydrate counting and food labels, ask your doctor, dietitian, or diabetes educator. How do you get started with meal planning? Here are some tips to get started:  · Plan your meals a week at a time. Don't forget to include snacks too. · Use cookbooks or online recipes to plan several main meals. Plan some quick meals for busy nights. You also can double some recipes that freeze well. Then you can save half for other busy nights when you don't have time to cook. · Make sure you have the ingredients you need for your recipes. If you're running low on basic items, put these items on your shopping list too. · List foods that you use to make breakfasts, lunches, and snacks. List plenty of fruits and vegetables. · Post this list on the refrigerator. Add to it as you think of more things you need. · Take the list to the store to do your weekly shopping. Follow-up care is a key part of your treatment and safety. Be sure to make and go to all appointments, and call your doctor if you are having problems. It's also a good idea to know your test results and keep a list of the medicines you take. Where can you learn more?   Go to http://debbie.info/. Boogie Kovacs in the search box to learn more about \"Learning About Meal Planning for Diabetes. \"  Current as of: April 16, 2019  Content Version: 12.2  © 7056-6303 [employer]. Care instructions adapted under license by [employer] (which disclaims liability or warranty for this information). If you have questions about a medical condition or this instruction, always ask your healthcare professional. [address] any warranty or liability for your use of this information.

## 2020-02-27 PROBLEM — E66.01 SEVERE OBESITY (HCC): Status: ACTIVE | Noted: 2020-02-27

## 2020-02-27 NOTE — PROGRESS NOTES
HISTORY OF PRESENT ILLNESS  Alexandro Vargas is a 46 y.o. male presents to establish care. He is accompanied by [facility]  HPI  Patient admitted to ED [facility] 2/16-2/18 with blood sugar of > 800. Diagnosed with Type 2 DM, mild DKA, HTN, volume depletion from osmotic diuresis. Hemoglobin a1c 12.2%     He is compliant with medical management  Denies ADRs  Comfortable with insulin administration and blood sugar monitoring     Takes 30 units Lantus insulin and 5 units  Novolog  TID with meals     Vision blurred when he reads    He has changed diet; less carb, only diet sodas, very little sweets     He would like to eventually  get off insulin     Seasonal, mild asthma     He stopped smoking since hospital admission     Social alcohol     Past Medical History:   Diagnosis Date    Asthma     Bronchitis     Diabetes ([address]     Hypertension      Current Outpatient Medications   Medication Sig    HUMALOG KWIKPEN INSULIN 100 unit/mL kwikpen     glucose blood VI test strips (ASCENSIA AUTODISC VI, ONE TOUCH ULTRA TEST VI) strip For One Touch    Blood-Glucose Meter monitoring kit One Touch    lancets misc For One Touch glucometer,    albuterol (PROVENTIL HFA, VENTOLIN HFA, PROAIR HFA) 90 mcg/actuation inhaler Take 2 Puffs by inhalation every four (4) hours as needed for Wheezing.  amLODIPine (NORVASC) 5 mg tablet Take 1 Tab by mouth daily.  LANTUS SOLOSTAR U-100 INSULIN 100 unit/mL (3 mL) inpn 34 Units by SubCUTAneous route daily. Increase by 4 units every 4 days until morning  blood sugar     metFORMIN (GLUCOPHAGE) 500 mg tablet Take 1 Tab by mouth two (2) times daily (with meals).  JACY PEN NEEDLE 32 gauge x 5/32\" ndle Use with insulin 4 times daily    insulin glargine (LANTUS SOLOSTAR U-100 INSULIN) 100 unit/mL (3 mL) inpn 30 Units by SubCUTAneous route nightly for 90 days.  insulin lispro (HUMALOG) 100 unit/mL kwikpen 5 Units by SubCUTAneous route Before breakfast, lunch, and dinner.     metFORMIN (GLUCOPHAGE) 500 mg tablet Take 1 Tab by mouth two (2) times daily (with meals) for 60 days.  amLODIPine (NORVASC) 5 mg tablet Take 1 Tab by mouth daily for 60 days.  Insulin Needles, Disposable, (BD JACY 2ND GEN PEN NEEDLE) 32 gauge x 5/32\" ndle For Humalog pens, 5 units TID before meals and for Lantus pens, 30 units QHS. Total of 4 needles a day needed     No current facility-administered medications for this visit. History reviewed. No pertinent surgical history. .  No Known Allergies     History reviewed. No pertinent family history. Review of Systems   Constitutional: Positive for weight loss. HENT: Negative. Eyes: Positive for blurred vision. Respiratory: Negative. Cardiovascular: Negative for chest pain, palpitations and leg swelling. Gastrointestinal: Negative. Genitourinary: Negative for frequency. Musculoskeletal: Negative. Skin: Negative. Neurological: Negative for dizziness and headaches. Endo/Heme/Allergies: Negative for polydipsia. Psychiatric/Behavioral: The patient is nervous/anxious. Visit Vitals  /70 (BP 1 Location: Left arm, BP Patient Position: Sitting)   Pulse 92   Temp 98.4 °F (36.9 °C) (Oral)   Resp 16   Ht 5' 6\" (1.676 m)   Wt 238 lb 6.4 oz (108.1 kg)   SpO2 99%   BMI 38.48 kg/m²     Physical Exam  Constitutional:       Appearance: He is obese. Eyes:      Conjunctiva/sclera: Conjunctivae normal.   Neck:      Vascular: No carotid bruit. Cardiovascular:      Rate and Rhythm: Normal rate and regular rhythm. Heart sounds: No murmur. Pulmonary:      Effort: Pulmonary effort is normal.      Breath sounds: Normal breath sounds. Skin:     General: Skin is warm and dry. Neurological:      General: No focal deficit present. Mental Status: He is alert. Cranial Nerves: No cranial nerve deficit. Psychiatric:         Attention and Perception: Attention normal.         Mood and Affect: Mood is anxious.          Speech: Speech normal.         Behavior: Behavior normal.         Thought Content: Thought content normal.         Cognition and Memory: Cognition normal.         Judgment: Judgment normal.         ASSESSMENT and PLAN    ICD-10-CM ICD-9-CM    1. Type 2 diabetes mellitus with hyperglycemia, without long-term current use of insulin (HCC) E11.65 250.00 glucose blood VI test strips (ASCENSIA AUTODISC VI, ONE TOUCH ULTRA TEST VI) strip     790.29 Blood-Glucose Meter monitoring kit      [facility]      REFERRAL TO DIABETES [facility]      LANTUS SOLOSTAR U-100 INSULIN 100 unit/mL (3 mL) inpn      metFORMIN (GLUCOPHAGE) 500 mg tablet      JACY PEN NEEDLE 32 gauge x 5/32\" ndle   2. Essential hypertension I10 401.9 amLODIPine (NORVASC) 5 mg tablet   3. Mild intermittent asthma without complication O93.72 232.02 albuterol (PROVENTIL HFA, VENTOLIN HFA, PROAIR HFA) 90 mcg/actuation inhaler   4. Ex-smoker for less than 1 year Z78.9 V49.89    5. Obesity (BMI 30-39. 9) E66.9 278.00      Follow-up and Dispositions    · Return in about 4 weeks (around 3/24/2020) for diabetes, htn, fasting labs. Hemoglobin a1c 12.2%. Blood sugar log show, average FBS > 170. Increase Lantus to 34 units form 30 units. Continue meal time insulin, and Metformin. Patient instructed to call provider with any concern and  blood sugar readings after 1 week.   Discussed goal to d/c meal time insulin with adequate dosing of basal insulin and/or oral agents  Reassured vision will improve       Normotensive        lab results and schedule of future lab studies reviewed with patient  reviewed medications and side effects in detail  specific diabetic recommendations: referral to Diabetic Education department, low cholesterol diet, weight control and daily exercise discussed, home glucose monitoring emphasized, all medications, side effects and compliance discussed carefully, glycohemoglobin and other lab monitoring discussed and long term diabetic complications discussed       Patient voices understanding and acceptance of this advice and will call back if any further questions or concerns. An After Visit Summary was printed and given to the patient.

## 2020-03-17 DIAGNOSIS — E11.65 TYPE 2 DIABETES MELLITUS WITH HYPERGLYCEMIA, WITHOUT LONG-TERM CURRENT USE OF INSULIN (HCC): ICD-10-CM

## 2020-03-17 NOTE — TELEPHONE ENCOUNTER
Per patient blood sugars are running very high and has been testing a couple times a day needs a refill on blood sugar test strips. The prescription frequency does not state how many times to test blood sugar. Patient had a prescription sent into [facility] on 02/25/2020 for #100 with 3 refills.      Last visit 2/25/2020 NP Earleen Gosselin   Next appointment 03/26/2020 NP Earleen Gosselin   Previous refill encounter(s) 02/25/2020 One Touch #100 with 3 refills    Requested Prescriptions     Pending Prescriptions Disp Refills    glucose blood VI test strips (ASCENSIA AUTODISC VI, ONE TOUCH ULTRA TEST VI) strip 100 Strip 3     Sig: For One Touch

## 2020-03-17 NOTE — TELEPHONE ENCOUNTER
Patient called requesting refill on his test strips. He advised his sugar levels have been very high and he has been testing a couple of times a day, which is why he has run out.

## 2020-03-23 ENCOUNTER — OFFICE VISIT (OUTPATIENT)
Dept: INTERNAL MEDICINE CLINIC | Age: 40
End: 2020-03-23

## 2020-03-23 VITALS
TEMPERATURE: 99 F | DIASTOLIC BLOOD PRESSURE: 80 MMHG | RESPIRATION RATE: 16 BRPM | OXYGEN SATURATION: 100 % | SYSTOLIC BLOOD PRESSURE: 140 MMHG | HEART RATE: 93 BPM

## 2020-03-23 DIAGNOSIS — I10 ESSENTIAL HYPERTENSION: ICD-10-CM

## 2020-03-23 DIAGNOSIS — E11.65 TYPE 2 DIABETES MELLITUS WITH HYPERGLYCEMIA, WITHOUT LONG-TERM CURRENT USE OF INSULIN (HCC): ICD-10-CM

## 2020-03-23 DIAGNOSIS — R73.9 HYPERGLYCEMIA: Primary | ICD-10-CM

## 2020-03-23 DIAGNOSIS — M79.10 MYALGIA: ICD-10-CM

## 2020-03-23 NOTE — PROGRESS NOTES
Rm 9    Chief Complaint   Patient presents with    Diabetes    Medication Evaluation   Pt is requesting a letter for work      1. Have you been to the ER, urgent care clinic since your last visit? Hospitalized since your last visit? No    2. Have you seen or consulted any other health care providers outside of the [address] [place] [address] since your last visit? Include any pap smears or colon screening.  No        Health Maintenance Due   Topic Date Due    Pneumococcal 0-64 years (1 of 1 - PPSV23) 12/28/1987    Foot Exam Q1  12/28/1991    MICROALBUMIN Q1  12/28/1991    Eye Exam Retinal or Dilated  12/28/1991    Lipid Screen  12/28/1991    DTaP/Tdap/Td series (1 - Tdap) 12/28/2002    Influenza Age 5 to Adult  08/01/2019   flu vaccine refused        3 most recent PHQ Screens 2/25/2020   Little interest or pleasure in doing things Not at all   Feeling down, depressed, irritable, or hopeless Not at all   Total Score PHQ 2 0           Learning Assessment 2/25/2020   PRIMARY LEARNER Patient   HIGHEST LEVEL OF EDUCATION - PRIMARY LEARNER  DID NOT GRADUATE HIGH SCHOOL   PRIMARY LANGUAGE ENGLISH   LEARNER PREFERENCE PRIMARY READING     LISTENING     DEMONSTRATION     VIDEOS   ANSWERED BY patient   RELATIONSHIP SELF

## 2020-03-23 NOTE — LETTER
3/23/2020 11:39 AM 
 
Mr. Odilon Sarmiento [id number] [phone] Stop meal time insulin Take 34 units Lantus insulin Start Empagliflozin (Jardiance) 10 mg if approved by insurer. If not approved continue meal time insulin and current does of Lantus until we get medication approved Call with any concerns Follow up in 1 months Sincerely, Claude Challenger, NP

## 2020-03-23 NOTE — LETTER
NOTIFICATION RETURN TO WORK / SCHOOL 
 
3/23/2020 11:28 AM 
 
Mr. Judy Patino [phone] [id number] To Whom It May Concern: 
 
Judy Patino is currently under the care of [facility]. He will return to work/school on: March 23, 2020, without restrictions If there are questions or concerns please have the patient contact our office. Sincerely, Anna Beckofrd NP

## 2020-03-23 NOTE — PATIENT INSTRUCTIONS
Home Blood Pressure Test: About This Test 
What is it? A home blood pressure test allows you to keep track of your blood pressure at home. Blood pressure is a measure of the force of blood against the walls of your arteries. Blood pressure readings include two numbers, such as 130/80 (say \"130 over 80\"). The first number is the systolic pressure. The second number is the diastolic pressure. Why is this test done? You may do this test at home to: · Find out if you have high blood pressure. · Track your blood pressure if you have high blood pressure. · Track how well medicine is working to reduce high blood pressure. · Check how lifestyle changes, such as weight loss and exercise, are affecting blood pressure. How do you prepare for the test? 
For at least 30 minutes before you take your blood pressure, don't exercise or use caffeine, tobacco, or medicines that raise blood pressure. Take your blood pressure while you feel comfortable and relaxed. Sit quietly with both feet on the floor for at least 5 minutes before the test. 
How is the test done? · Sit with your arm slightly bent and resting on a table so that your upper arm is at the same level as your heart. · Roll up your sleeve or take off your shirt to expose your upper arm. · Wrap the blood pressure cuff around your upper arm so that the lower edge of the cuff is about 1 inch above the bend of your elbow. Proceed with the following steps depending on if you are using an automatic or manual pressure monitor. Automatic blood pressure monitors · Press the on/off button on the automatic monitor and wait until the ready-to-measure \"heart\" symbol appears next to zero in the display window. · Press the start button. The cuff will inflate and deflate by itself. · Your blood pressure numbers will appear on the screen. · Write your numbers in your log book, along with the date and time. Manual blood pressure monitors · Place the earpieces of a stethoscope in your ears, and place the bell of the stethoscope over the artery, just below the cuff. · Close the valve on the rubber inflating bulb. · Squeeze the bulb rapidly with your opposite hand to inflate the cuff until the dial or column of mercury reads about 30 mm Hg higher than your usual systolic pressure. If you do not know your usual pressure, inflate the cuff to 210 mm Hg or until the pulse at your wrist disappears. · Open the pressure valve just slightly by twisting or pressing the valve on the bulb. · As you watch the pressure slowly fall, note the level on the dial at which you first start to hear a pulsing or tapping sound through the stethoscope. This is your systolic blood pressure. · Continue letting the air out slowly. The sounds will become muffled and will finally disappear. Note the pressure when the sounds completely disappear. This is your diastolic blood pressure. Let out all the remaining air. · Write your numbers in your log book, along with the date and time. Follow-up care is a key part of your treatment and safety. Be sure to make and go to all appointments, and call your doctor if you are having problems. It's also a good idea to keep a list of the medicines you take. Where can you learn more? Go to http://bro-estfeani.info/ Enter C427 in the search box to learn more about \"Home Blood Pressure Test: About This Test.\" Current as of: December 15, 2019Content Version: 12.4 © 9862-7024 Healthwise, Incorporated. Care instructions adapted under license by [employer] (which disclaims liability or warranty for this information). If you have questions about a medical condition or this instruction, always ask your healthcare professional. [address] any warranty or liability for your use of this information. Home Blood Glucose Test: About This Test 
What is it? A home blood sugar test measures the amount of sugar (glucose) in your blood, using a small device called a blood sugar meter. It's a quick way to test your blood sugar anywhere, at any time. Why is this test done? Testing your blood sugar helps you know if your levels are in your target range. It helps you know when to take action and may help you avoid blood sugar emergencies. Testing also helps you learn how things like exercise, stress, and what you eat can affect your blood sugar. What happens before the test? 
The supplies you will need for testing blood sugar include: · A blood glucose meter. · Testing strips. These are made to be used with a specific model of meter. Make sure the strips haven't . · Sugar control solutions. Some meters require a specific solution. Many new meters are made to operate without a control solution. · Short needles called lancets for pricking your skin. · A pen-sized nagel for the lancet (lancet device). It positions the lancet and controls how deeply it goes into your skin. · Clean cotton balls. These are used to stop the bleeding from the testing site. What happens during the test? 
Checking your blood sugar involves pricking your finger, palm, or forearm with a lancet to collect a drop of blood. The blood drop is placed on a test strip, which you insert into the blood glucose meter. The instructions for testing are slightly different for each blood glucose meter model. Follow the instructions that came with your meter. · Wash your hands with warm, soapy water. Dry them well with a clean towel. You may also use an alcohol wipe to clean your finger or other site, but make sure your hands are dry before the test. 
· Insert a clean lancet into the lancet device. · Remove a test strip from the test strip bottle. Replace the lid right away to keep moisture away from the other strips. · Follow the instructions that came with your meter to get it ready. · Use the lancet device to stick the side of your fingertip with the lancet. Do not stick the tip of your finger. Some blood sugar meters use lancet devices that take the blood sample from other sites, such as the palm of the hand or the forearm. But the finger is usually the most accurate place to test blood sugar. · Put a drop of blood on the correct spot on the test strip. · Apply pressure with a clean cotton ball to stop the bleeding. · Follow the directions that came with the meter to get the results. · Write down the results and the time that you tested your blood. Some meters will store the results for you. How long does the test take? The blood glucose meter will show the results of the test in a minute or less. What are the possible results for the test? 
The American Diabetes Association (ADA) recommends that you stay within the following blood glucose level ranges. But depending on your health, you and your doctor may set a different range for you. For nonpregnant adults with diabetes · 80 milligrams per deciliter (mg/dL) to 130 mg/dL before a meal 
· Less than 180 mg/dL 1 to 2 hours after a meal 
For women who have diabetes related to pregnancy (gestational diabetes) · 95 mg/dL or less before breakfast 
· 120 to 140 mg/dL (or lower) 1 to 2 hours after a meal 
Where can you learn more? Go to http://bro-estefani.info/ Enter B788 in the search box to learn more about \"Home Blood Glucose Test: About This Test.\" Current as of: December 19, 2019Content Version: 12.4 © 1647-0737 Healthwise, Incorporated. Care instructions adapted under license by [employer] (which disclaims liability or warranty for this information). If you have questions about a medical condition or this instruction, always ask your healthcare professional. [address] any warranty or liability for your use of this information. Diabetes Blood Sugar Emergencies: Your Action Plan How can you prevent a blood sugar emergency? An important part of living with diabetes is keeping your blood sugar in your target range. You'll need to know what to do if it's too high or too low. Managing your blood sugar levels helps you avoid emergencies. This care sheet will teach you about the signs of high and low blood sugar. It will help you make an action plan with your doctor for when these signs occur. Low blood sugar is more likely to happen if you take certain medicines for diabetes. It can also happen if you skip a meal, drink alcohol, or exercise more than usual. 
You may get high blood sugar if you eat differently than you normally do. One example is eating more carbohydrate than usual. Having a cold, the flu, or other sudden illness can also cause high blood sugar levels. Levels can also rise if you miss a dose of medicine. Any change in how you take your medicine may affect your blood sugar level. So it's important to work with your doctor before you make any changes. Check your blood sugar Work with your doctor to fill in the blank spaces below that apply to you. Track your levels, know your target range, and write down ways you can get your blood sugar back in your target range. A log book can help you track your levels. Take the book to all of your medical appointments. · Check your blood sugar _____ times a day, at these times:________________________________________________. (For example: Before meals, at bedtime, before exercise, during exercise, other.) · Your blood sugar target range before a meal is ___________________. Your blood sugar target range after a meal is _______________________. · Do this___________________________________________________to get your blood sugar back within your safe range if your blood sugar results are _________________________________________. (For example: Less than 70 or above 250 mg/dL. ) Call your doctor when your blood sugar results are ___________________________________. (For example: Less than 70 or above 250 mg/dL.) What are the symptoms of low and high blood sugar? Common symptoms of low blood sugar are sweating and feeling shaky, weak, hungry, or confused. Symptoms can start quickly. Common symptoms of high blood sugar are feeling very thirsty or very hungry. You may also pass urine more often than usual. You may have blurry vision and may lose weight without trying. But some people may have high or low blood sugar without having any symptoms. That's a good reason to check your blood sugar on a regular schedule. What should you do if you have symptoms? Work with your doctor to fill in the blank spaces below that apply to you. Low blood sugar If you have symptoms of low blood sugar, check your blood sugar. If it's below _____ ( for example, below 70), eat or drink a quick-sugar food that has about 15 grams of carbohydrate. Your goal is to get your level back to your safe range. Check your blood sugar again 15 minutes later. If it's still not in your target range, take another 15 grams of carbohydrate and check your blood sugar again in 15 minutes. Repeat this until you reach your target. Then go back to your regular testing schedule. Children usually need less than 15 grams of carbohydrate. Check with your doctor or diabetes educator for the amount that is right for your child. When you have low blood sugar, it's best to stop or reduce any physical activity until your blood sugar is back in your target range and is stable. If you must stay active, eat or drink 30 grams of carbohydrate. Then check your blood sugar again in 15 minutes. If it's not in your target range, take another 30 grams of carbohydrates. Check your blood sugar again in 15 minutes. Keep doing this until you reach your target. You can then go back to your regular testing schedule. If your symptoms or blood sugar levels are getting worse or have not improved after 15 minutes, seek medical care right away. Here are some examples of quick-sugar foods with 15 grams of carbohydrate: · 3 or 4 glucose tablets · 1 tablespoon (3 teaspoons) table sugar · ½ cup to ¾ cup (4 to 6 ounces) of fruit juice or regular (not diet) soda · Hard candy (such as 6 Life Savers) High blood sugar If you have symptoms of high blood sugar, check your blood sugar. Your goal is to get your level back to your target range. If it's above ______ ( for example, above 250), follow these steps: · If you missed a dose of your diabetes medicine, take it now. Take only the amount of medicine that you have been prescribed. Do not take more or less medicine. · Give yourself insulin if your doctor has prescribed it for high blood sugar. · Test for ketones, if the doctor told you to do so. If the results of the ketone test show a moderate-to-large amount of ketones, call the doctor for advice. · Wait 30 minutes after you take the extra insulin or the missed medicine. Check your blood sugar again. If your symptoms or blood sugar levels are getting worse or have not improved after taking these steps, seek medical care right away. Follow-up care is a key part of your treatment and safety. Be sure to make and go to all appointments, and call your doctor if you are having problems. It's also a good idea to know your test results and keep a list of the medicines you take. Where can you learn more? Go to http://bro-estefani.info/ Enter E471 in the search box to learn more about \"Diabetes Blood Sugar Emergencies: Your Action Plan. \" Current as of: December 19, 2019Content Version: 12.4 © 0129-5700 Healthwise, Incorporated. Care instructions adapted under license by [employer] (which disclaims liability or warranty for this information).  If you have questions about a medical condition or this instruction, always ask your healthcare professional. [address] any warranty or liability for your use of this information. Learning About Meal Planning for Diabetes Why plan your meals? Meal planning can be a key part of managing diabetes. Planning meals and snacks with the right balance of carbohydrate, protein, and fat can help you keep your blood sugar at the target level you set with your doctor. You don't have to eat special foods. You can eat what your family eats, including sweets once in a while. But you do have to pay attention to how often you eat and how much you eat of certain foods. You may want to work with a dietitian or a certified diabetes educator. He or she can give you tips and meal ideas and can answer your questions about meal planning. This health professional can also help you reach a healthy weight if that is one of your goals. What plan is right for you? Your dietitian or diabetes educator may suggest that you start with the plate format or carbohydrate counting. The plate format The plate format is a simple way to help you manage how you eat. You plan meals by learning how much space each food should take on a plate. Using the plate format helps you spread carbohydrate throughout the day. It can make it easier to keep your blood sugar level within your target range. It also helps you see if you're eating healthy portion sizes. To use the plate format, you put non-starchy vegetables on half your plate. Add meat or meat substitutes on one-quarter of the plate. Put a grain or starchy vegetable (such as brown rice or a potato) on the final quarter of the plate. You can add a small piece of fruit and some low-fat or fat-free milk or yogurt, depending on your carbohydrate goal for each meal. 
Here are some tips for using the plate format: · Make sure that you are not using an oversized plate. A 9-inch plate is best. Many restaurants use larger plates. · Get used to using the plate format at home. Then you can use it when you eat out. · Write down your questions about using the plate format. Talk to your doctor, a dietitian, or a diabetes educator about your concerns. Carbohydrate counting With carbohydrate counting, you plan meals based on the amount of carbohydrate in each food. Carbohydrate raises blood sugar higher and more quickly than any other nutrient. It is found in desserts, breads and cereals, and fruit. It's also found in starchy vegetables such as potatoes and corn, grains such as rice and pasta, and milk and yogurt. Spreading carbohydrate throughout the day helps keep your blood sugar levels within your target range. Your daily amount depends on several things, including your weight, how active you are, which diabetes medicines you take, and what your goals are for your blood sugar levels. A registered dietitian or diabetes educator can help you plan how much carbohydrate to include in each meal and snack. A guideline for your daily amount of carbohydrate is: · 45 to 60 grams at each meal. That's about the same as 3 to 4 carbohydrate servings. · 15 to 20 grams at each snack. That's about the same as 1 carbohydrate serving. The Nutrition Facts label on packaged foods tells you how much carbohydrate is in a serving of the food. First, look at the serving size on the food label. Is that the amount you eat in a serving? All of the nutrition information on a food label is based on that serving size. So if you eat more or less than that, you'll need to adjust the other numbers. Total carbohydrate is the next thing you need to look for on the label. If you count carbohydrate servings, one serving of carbohydrate is 15 grams.  
For foods that don't come with labels, such as fresh fruits and vegetables, you'll need a guide that lists carbohydrate in these foods. Ask your doctor, dietitian, or diabetes educator about books or other nutrition guides you can use. If you take insulin, you need to know how many grams of carbohydrate are in a meal. This lets you know how much rapid-acting insulin to take before you eat. If you use an insulin pump, you get a constant rate of insulin during the day. So the pump must be programmed at meals to give you extra insulin to cover the rise in blood sugar after meals. When you know how much carbohydrate you will eat, you can take the right amount of insulin. Or, if you always use the same amount of insulin, you need to make sure that you eat the same amount of carbohydrate at meals. If you need more help to understand carbohydrate counting and food labels, ask your doctor, dietitian, or diabetes educator. How do you get started with meal planning? Here are some tips to get started: 
· Plan your meals a week at a time. Don't forget to include snacks too. · Use cookbooks or online recipes to plan several main meals. Plan some quick meals for busy nights. You also can double some recipes that freeze well. Then you can save half for other busy nights when you don't have time to cook. · Make sure you have the ingredients you need for your recipes. If you're running low on basic items, put these items on your shopping list too. · List foods that you use to make breakfasts, lunches, and snacks. List plenty of fruits and vegetables. · Post this list on the refrigerator. Add to it as you think of more things you need. · Take the list to the store to do your weekly shopping. Follow-up care is a key part of your treatment and safety. Be sure to make and go to all appointments, and call your doctor if you are having problems. It's also a good idea to know your test results and keep a list of the medicines you take. Where can you learn more? Go to http://bro-estefani.info/ Malini Hartman in the search box to learn more about \"Learning About Meal Planning for Diabetes. \" Current as of: December 19, 2019Content Version: 12.4 © 5755-7182 Healthwise, Incorporated. Care instructions adapted under license by [employer] (which disclaims liability or warranty for this information). If you have questions about a medical condition or this instruction, always ask your healthcare professional. [address] any warranty or liability for your use of this information. Learning About Diabetes Food Guidelines Your Care Instructions Meal planning is important to manage diabetes. It helps keep your blood sugar at a target level (which you set with your doctor). You don't have to eat special foods. You can eat what your family eats, including sweets once in a while. But you do have to pay attention to how often you eat and how much you eat of certain foods. You may want to work with a dietitian or a certified diabetes educator (CDE) to help you plan meals and snacks. A dietitian or CDE can also help you lose weight if that is one of your goals. What should you know about eating carbs? Managing the amount of carbohydrate (carbs) you eat is an important part of healthy meals when you have diabetes. Carbohydrate is found in many foods. · Learn which foods have carbs. And learn the amounts of carbs in different foods. ? Bread, cereal, pasta, and rice have about 15 grams of carbs in a serving. A serving is 1 slice of bread (1 ounce), ½ cup of cooked cereal, or 1/3 cup of cooked pasta or rice. ? Fruits have 15 grams of carbs in a serving. A serving is 1 small fresh fruit, such as an apple or orange; ½ of a banana; ½ cup of cooked or canned fruit; ½ cup of fruit juice; 1 cup of melon or raspberries; or 2 tablespoons of dried fruit. ? Milk and no-sugar-added yogurt have 15 grams of carbs in a serving.  A serving is 1 cup of milk or 2/3 cup of no-sugar-added yogurt. ? Starchy vegetables have 15 grams of carbs in a serving. A serving is ½ cup of mashed potatoes or sweet potato; 1 cup winter squash; ½ of a small baked potato; ½ cup of cooked beans; or ½ cup cooked corn or green peas. · Learn how much carbs to eat each day and at each meal. A dietitian or CDE can teach you how to keep track of the amount of carbs you eat. This is called carbohydrate counting. · If you are not sure how to count carbohydrate grams, use the Plate Method to plan meals. It is a good, quick way to make sure that you have a balanced meal. It also helps you spread carbs throughout the day. ? Divide your plate by types of foods. Put non-starchy vegetables on half the plate, meat or other protein food on one-quarter of the plate, and a grain or starchy vegetable in the final quarter of the plate. To this you can add a small piece of fruit and 1 cup of milk or yogurt, depending on how many carbs you are supposed to eat at a meal. 
· Try to eat about the same amount of carbs at each meal. Do not \"save up\" your daily allowance of carbs to eat at one meal. 
· Proteins have very little or no carbs per serving. Examples of proteins are beef, chicken, turkey, fish, eggs, tofu, cheese, cottage cheese, and peanut butter. A serving size of meat is 3 ounces, which is about the size of a deck of cards. Examples of meat substitute serving sizes (equal to 1 ounce of meat) are 1/4 cup of cottage cheese, 1 egg, 1 tablespoon of peanut butter, and ½ cup of tofu. How can you eat out and still eat healthy? · Learn to estimate the serving sizes of foods that have carbohydrate. If you measure food at home, it will be easier to estimate the amount in a serving of restaurant food. · If the meal you order has too much carbohydrate (such as potatoes, corn, or baked beans), ask to have a low-carbohydrate food instead. Ask for a salad or green vegetables. · If you use insulin, check your blood sugar before and after eating out to help you plan how much to eat in the future. · If you eat more carbohydrate at a meal than you had planned, take a walk or do other exercise. This will help lower your blood sugar. What else should you know? · Limit saturated fat, such as the fat from meat and dairy products. This is a healthy choice because people who have diabetes are at higher risk of heart disease. So choose lean cuts of meat and nonfat or low-fat dairy products. Use olive or canola oil instead of butter or shortening when cooking. · Don't skip meals. Your blood sugar may drop too low if you skip meals and take insulin or certain medicines for diabetes. · Check with your doctor before you drink alcohol. Alcohol can cause your blood sugar to drop too low. Alcohol can also cause a bad reaction if you take certain diabetes medicines. Follow-up care is a key part of your treatment and safety. Be sure to make and go to all appointments, and call your doctor if you are having problems. It's also a good idea to know your test results and keep a list of the medicines you take. Where can you learn more? Go to http://bro-estefani.info/ Enter P426 in the search box to learn more about \"Learning About Diabetes Food Guidelines. \" Current as of: December 19, 2019Content Version: 12.4 © 8784-8647 [employer]. Care instructions adapted under license by [employer] (which disclaims liability or warranty for this information). If you have questions about a medical condition or this instruction, always ask your healthcare professional. [address] any warranty or liability for your use of this information.

## 2020-03-25 RX ORDER — LANCETS
EACH MISCELLANEOUS
Qty: 100 STRIP | Refills: 3 | Status: SHIPPED | OUTPATIENT
Start: 2020-03-25

## 2020-03-25 RX ORDER — INSULIN PUMP SYRINGE, 3 ML
EACH MISCELLANEOUS
Qty: 1 KIT | Refills: 0 | Status: SHIPPED | OUTPATIENT
Start: 2020-03-25 | End: 2020-03-27 | Stop reason: ALTCHOICE

## 2020-03-25 RX ORDER — LANCETS
EACH MISCELLANEOUS
Qty: 100 EACH | Refills: 3 | Status: SHIPPED | OUTPATIENT
Start: 2020-03-25 | End: 2020-03-27 | Stop reason: ALTCHOICE

## 2020-03-25 NOTE — PROGRESS NOTES
Flavio Holm is a 46 y.o. male presents for short interval diabetes follow up   HPI   He is compliant with medical management     Blood sugars much better; fasting blood sugar 100's     Metformin discontinued 2/2 muscle aches and diarrhea, now resolved. He would like alternate oral agent, similar to Metformin to maintain weight loss     Insulin up titrated     He denies symptoms of low or high blood sugar     Past Medical History:   Diagnosis Date    Asthma     Bronchitis     Diabetes ([address]     Hypertension        Current Outpatient Medications   Medication Sig    Blood Sugar Diagnostic, Drum (Accu-Chek Compact Plus Test) strp Check blood sugar twice daily    lancets (Accu-Chek Softclix Lancets) misc Check blood before breakfast and 2 hours after a meal    Blood-Glucose Meter monitoring kit One Touch    glucose blood VI test strips (ASCENSIA AUTODISC VI, ONE TOUCH ULTRA TEST VI) strip For One Touch    empagliflozin (Jardiance) 10 mg tablet Take 1 Tab by mouth daily.  HUMALOG KWIKPEN INSULIN 100 unit/mL kwikpen     albuterol (PROVENTIL HFA, VENTOLIN HFA, PROAIR HFA) 90 mcg/actuation inhaler Take 2 Puffs by inhalation every four (4) hours as needed for Wheezing.  amLODIPine (NORVASC) 5 mg tablet Take 1 Tab by mouth daily.  LANTUS SOLOSTAR U-100 INSULIN 100 unit/mL (3 mL) inpn 34 Units by SubCUTAneous route daily. Increase by 4 units every 4 days until morning  blood sugar     JACY PEN NEEDLE 32 gauge x 5/32\" ndle Use with insulin 4 times daily    insulin glargine (LANTUS SOLOSTAR U-100 INSULIN) 100 unit/mL (3 mL) inpn 30 Units by SubCUTAneous route nightly for 90 days.  insulin lispro (HUMALOG) 100 unit/mL kwikpen 5 Units by SubCUTAneous route Before breakfast, lunch, and dinner.  amLODIPine (NORVASC) 5 mg tablet Take 1 Tab by mouth daily for 60 days.     Insulin Needles, Disposable, (BD JACY 2ND GEN PEN NEEDLE) 32 gauge x 5/32\" ndle For Humalog pens, 5 units TID before meals and for Lantus pens, 30 units QHS. Total of 4 needles a day needed    metFORMIN (GLUCOPHAGE) 500 mg tablet Take 1 Tab by mouth two (2) times daily (with meals) for 60 days. No current facility-administered medications for this visit. No Known Allergies  Review of Systems   Constitutional: Negative. Eyes: Negative for blurred vision. Respiratory: Negative. Cardiovascular: Negative. Genitourinary: Negative. Objective  Visit Vitals  /80   Pulse 93   Temp 99 °F (37.2 °C)   Resp 16   SpO2 100%     Physical Exam  Constitutional:       Appearance: Normal appearance. Cardiovascular:      Rate and Rhythm: Normal rate and regular rhythm. Pulmonary:      Effort: Pulmonary effort is normal.      Breath sounds: Normal breath sounds. Neurological:      Mental Status: He is alert. Assessment & Plan    ICD-10-CM ICD-9-CM    1. Hyperglycemia R73.9 790.29 AMB POC GLUCOSE BLOOD, BY GLUCOSE MONITORING DEVICE      LIPID PANEL   2. Type 2 diabetes mellitus with hyperglycemia, without long-term current use of insulin ([facility]) E11.65 250.00 glucose blood VI test strips (ASCENSIA AUTODISC VI, ONE TOUCH ULTRA TEST VI) strip     790.29 empagliflozin (Jardiance) 10 mg tablet      Blood Sugar Diagnostic, Drum (Accu-Chek Compact Plus Test) strp      lancets (Accu-Chek Softclix Lancets) misc      Blood-Glucose Meter monitoring kit   3. Essential hypertension R85 933.4 METABOLIC PANEL, COMPREHENSIVE   4. Myalgia M79.10 729.1 VITAMIN D, 25 HYDROXY      TSH AND FREE T4      CBC WITH AUTOMATED DIFF     Follow-up and Dispositions    · Return in about 4 weeks (around 4/20/2020) for diabetes, htn.        Discussed alternate medical management; add Jardiance, with instructions to down titrate insulin    lab results and schedule of future lab studies reviewed with patient  reviewed diet, exercise and weight control  cardiovascular risk and specific lipid/LDL goals reviewed  reviewed medications and side effects in detail  specific diabetic recommendations: low cholesterol diet, weight control and daily exercise discussed, home glucose monitoring emphasized, all medications, side effects and compliance discussed carefully, annual eye examinations at Ophthalmology discussed and glycohemoglobin and other lab monitoring discussed       Patient voices understanding and acceptance of this advice and will call back if any further questions or concerns. An After Visit Summary was printed and given to the patient.   Katy Singleton NP

## 2020-03-30 ENCOUNTER — TELEPHONE (OUTPATIENT)
Dept: INTERNAL MEDICINE CLINIC | Age: 40
End: 2020-03-30

## 2020-03-30 DIAGNOSIS — E11.65 TYPE 2 DIABETES MELLITUS WITH HYPERGLYCEMIA, WITHOUT LONG-TERM CURRENT USE OF INSULIN (HCC): Primary | ICD-10-CM

## 2020-03-30 RX ORDER — LANCETS
EACH MISCELLANEOUS
Qty: 100 EACH | Refills: 3 | Status: SHIPPED | OUTPATIENT
Start: 2020-03-30 | End: 2020-10-07

## 2020-03-30 RX ORDER — INSULIN PUMP SYRINGE, 3 ML
EACH MISCELLANEOUS
Qty: 1 KIT | Refills: 0 | Status: SHIPPED | OUTPATIENT
Start: 2020-03-30

## 2020-03-30 NOTE — TELEPHONE ENCOUNTER
Pt is calling for status check on prior auth needed for Jardiance 10 mg. Please advise if this has been submitted and when.

## 2020-04-01 NOTE — TELEPHONE ENCOUNTER
PA initiated (Key: [id number]) for Jardiance         Your demographic data has been sent to the plan successfully. They will respond with your clinical questions and you will be notified by email when available within the next business day. You can also check for an update later by opening this request from your dashboard. Please do not fax or call the plan to resubmit this request.    If you need assistance, please chat with [employer] or call us at [phone].

## 2020-04-22 ENCOUNTER — VIRTUAL VISIT (OUTPATIENT)
Dept: INTERNAL MEDICINE CLINIC | Age: 40
End: 2020-04-22

## 2020-04-22 VITALS — BODY MASS INDEX: 36.32 KG/M2 | WEIGHT: 225 LBS

## 2020-04-22 DIAGNOSIS — J45.20 MILD INTERMITTENT ASTHMA WITHOUT COMPLICATION: ICD-10-CM

## 2020-04-22 DIAGNOSIS — E11.65 TYPE 2 DIABETES MELLITUS WITH HYPERGLYCEMIA, WITHOUT LONG-TERM CURRENT USE OF INSULIN (HCC): ICD-10-CM

## 2020-04-22 DIAGNOSIS — I10 ESSENTIAL HYPERTENSION: Primary | ICD-10-CM

## 2020-04-22 RX ORDER — INSULIN GLARGINE 100 [IU]/ML
30 INJECTION, SOLUTION SUBCUTANEOUS DAILY
Qty: 5 ADJUSTABLE DOSE PRE-FILLED PEN SYRINGE | Refills: 3 | COMMUNITY
Start: 2020-04-22 | End: 2020-07-07

## 2020-04-22 NOTE — PROGRESS NOTES
Subjective  Ronny Friday is a 46 y.o. male presents for routine visint   Emanuelny Friday is a 46 y.o. male being evaluated by a Virtual Visit (video visit) encounter to address concerns as mentioned above. A caregiver was present when appropriate. Due to this being a TeleHealth encounter (During [place] public health emergency), evaluation of the following organ systems was limited: Vitals/Constitutional/EENT/Resp/CV/GI//MS/Neuro/Skin/Heme-Lymph-Imm. Pursuant to the emergency declaration under the [address] [place] [address], [address] [place] [address] and the [employer] Act, this Virtual Visit was conducted with patient's (and/or legal guardian's) consent, to reduce the risk of exposure to COVID-19 and provide necessary medical care. Services were provided through a video synchronous discussion virtually to substitute for in-person encounter. --Alka Gaona NP on 4/22/2020 at 1:22 PM    An electronic signature was used to authenticate this note. HPI   He is tolerating Jardiance started LOV, fasting blood sugars 90's and 100's. No longer needs meal time or sliding scale insulin  Continues to take Lantus 34 units daily   He is losing weight; 10 lbs since LOV  Asthma stable   No blood pressure monitoring       Past Medical History:   Diagnosis Date    Asthma     Bronchitis     [facility]     Hypertension        Current Outpatient Medications   Medication Sig    Lantus Solostar U-100 Insulin 100 unit/mL (3 mL) inpn 30 Units by SubCUTAneous route daily. Increase by 4 units every 4 days until morning  blood sugar     empagliflozin (Jardiance) 10 mg tablet Take 1 Tab by mouth daily.     glucose blood VI test strips (ASCENSIA AUTODISC VI, ONE TOUCH ULTRA TEST VI) strip Check blood sugar before breakfast and 2 hours after a meal    Blood-Glucose Meter monitoring kit One Touch    Blood Sugar Diagnostic, Drum (Accu-Chek Ami Plus Test) strp Check blood sugar twice daily    albuterol (PROVENTIL HFA, VENTOLIN HFA, PROAIR HFA) 90 mcg/actuation inhaler Take 2 Puffs by inhalation every four (4) hours as needed for Wheezing.  amLODIPine (NORVASC) 5 mg tablet Take 1 Tab by mouth daily.  JACY PEN NEEDLE 32 gauge x 5/32\" ndle Use with insulin 4 times daily    lancets (OneTouch UltraSoft Lancets) misc Check blood sugar before breakfast and 2 hours after a meal    Insulin Needles, Disposable, (BD JACY 2ND GEN PEN NEEDLE) 32 gauge x 5/32\" ndle For Humalog pens, 5 units TID before meals and for Lantus pens, 30 units QHS. Total of 4 needles a day needed     No current facility-administered medications for this visit. Allergies   Allergen Reactions    Metformin Myalgia     Review of Systems   Constitutional: Positive for weight loss (intentional ). HENT: Negative. Eyes: Negative. Respiratory: Negative. Cardiovascular: Negative. Gastrointestinal: Negative. Genitourinary: Negative. Endo/Heme/Allergies: Negative. Objective  Physical Exam     Assessment & Plan    ICD-10-CM ICD-9-CM    1. Essential hypertension I10 401.9    2. Type 2 diabetes mellitus with hyperglycemia, without long-term current use of insulin ([facility]) E11.65 250.00 Lantus Solostar U-100 Insulin 100 unit/mL (3 mL) inpn     790.29 empagliflozin (Jardiance) 10 mg tablet   3. Mild intermittent asthma without complication S79.08 935.99      Follow-up and Dispositions    · Return in about 3 months (around 7/22/2020) for diabetes, htn, fasting labs.            the following changes in treatment are made: decrease Lantus insulin to 30 units, continue other medications   lab results and schedule of future lab studies reviewed with patient  reviewed diet, exercise and weight control  reviewed medications and side effects in detail  specific diabetic recommendations: low cholesterol diet, weight control and daily exercise discussed, home glucose monitoring emphasized, all medications, side effects and compliance discussed carefully and glycohemoglobin and other lab monitoring discussed     Patient voices understanding and acceptance of this advice and will call back if any further questions or concerns.   Dara Bridges, NP

## 2020-04-22 NOTE — PATIENT INSTRUCTIONS
Decrease Lantus insulin to 30 units daily Continue Jardiance 10 mg daily Check blood sugar before breakfast and 2 hours after a meal, call with readings after 2-4 weeks Continue amlodipine 5 mg for blood pressure Keep up the good work

## 2020-04-22 NOTE — PROGRESS NOTES
[phone]    Doesn't keep record BP's and BS's     Chief Complaint   Patient presents with    Diabetes     f/u    Hypertension     f/u     1. Have you been to the ER, urgent care clinic since your last visit? Hospitalized since your last visit? No    2. Have you seen or consulted any other health care providers outside of the [address] [place] [address] since your last visit? Include any pap smears or colon screening.  No  Health Maintenance Due   Topic Date Due    Pneumococcal 0-64 years (1 of 1 - PPSV23) 12/28/1987    Foot Exam Q1  12/28/1991    MICROALBUMIN Q1  12/28/1991    Eye Exam Retinal or Dilated  12/28/1991    Lipid Screen  12/28/1991    DTaP/Tdap/Td series (1 - Tdap) 12/28/2002     3 most recent PHQ Screens 2/25/2020   Little interest or pleasure in doing things Not at all   Feeling down, depressed, irritable, or hopeless Not at all   Total Score PHQ 2 0

## 2020-07-03 DIAGNOSIS — E11.65 TYPE 2 DIABETES MELLITUS WITH HYPERGLYCEMIA, WITHOUT LONG-TERM CURRENT USE OF INSULIN (HCC): ICD-10-CM

## 2020-07-07 RX ORDER — INSULIN GLARGINE 100 [IU]/ML
INJECTION, SOLUTION SUBCUTANEOUS
Qty: 15 ADJUSTABLE DOSE PRE-FILLED PEN SYRINGE | Refills: 3 | Status: SHIPPED | OUTPATIENT
Start: 2020-07-07 | End: 2020-11-12

## 2020-07-30 DIAGNOSIS — I10 ESSENTIAL HYPERTENSION: ICD-10-CM

## 2020-07-31 RX ORDER — AMLODIPINE BESYLATE 5 MG/1
TABLET ORAL
Qty: 30 TAB | Refills: 3 | Status: SHIPPED | OUTPATIENT
Start: 2020-07-31 | End: 2020-11-24

## 2020-09-18 DIAGNOSIS — E11.65 TYPE 2 DIABETES MELLITUS WITH HYPERGLYCEMIA, WITHOUT LONG-TERM CURRENT USE OF INSULIN (HCC): ICD-10-CM

## 2020-09-19 RX ORDER — EMPAGLIFLOZIN 10 MG/1
TABLET, FILM COATED ORAL
Qty: 30 TAB | Refills: 5 | Status: SHIPPED | OUTPATIENT
Start: 2020-09-19 | End: 2021-03-15 | Stop reason: SDUPTHER

## 2020-10-07 DIAGNOSIS — E11.65 TYPE 2 DIABETES MELLITUS WITH HYPERGLYCEMIA, WITHOUT LONG-TERM CURRENT USE OF INSULIN (HCC): ICD-10-CM

## 2020-10-07 RX ORDER — LANCETS 33 GAUGE
EACH MISCELLANEOUS
Qty: 100 LANCET | Refills: 3 | Status: SHIPPED | OUTPATIENT
Start: 2020-10-07

## 2020-11-12 DIAGNOSIS — E11.65 TYPE 2 DIABETES MELLITUS WITH HYPERGLYCEMIA, WITHOUT LONG-TERM CURRENT USE OF INSULIN (HCC): ICD-10-CM

## 2020-11-12 RX ORDER — INSULIN GLARGINE 100 [IU]/ML
INJECTION, SOLUTION SUBCUTANEOUS
Qty: 15 ADJUSTABLE DOSE PRE-FILLED PEN SYRINGE | Refills: 3 | Status: SHIPPED | OUTPATIENT
Start: 2020-11-12 | End: 2021-08-06 | Stop reason: SDUPTHER

## 2020-11-23 DIAGNOSIS — I10 ESSENTIAL HYPERTENSION: ICD-10-CM

## 2020-11-24 RX ORDER — AMLODIPINE BESYLATE 5 MG/1
TABLET ORAL
Qty: 30 TAB | Refills: 3 | Status: SHIPPED | OUTPATIENT
Start: 2020-11-24 | End: 2021-08-06 | Stop reason: SDUPTHER

## 2021-03-15 DIAGNOSIS — E11.65 TYPE 2 DIABETES MELLITUS WITH HYPERGLYCEMIA, WITHOUT LONG-TERM CURRENT USE OF INSULIN (HCC): ICD-10-CM

## 2021-07-20 DIAGNOSIS — J45.20 MILD INTERMITTENT ASTHMA WITHOUT COMPLICATION: ICD-10-CM

## 2021-07-20 RX ORDER — ALBUTEROL SULFATE 90 UG/1
AEROSOL, METERED RESPIRATORY (INHALATION)
Qty: 6.7 INHALER | Refills: 3 | Status: SHIPPED | OUTPATIENT
Start: 2021-07-20 | End: 2022-03-15

## 2021-08-06 DIAGNOSIS — E11.65 TYPE 2 DIABETES MELLITUS WITH HYPERGLYCEMIA, WITHOUT LONG-TERM CURRENT USE OF INSULIN (HCC): ICD-10-CM

## 2021-08-06 DIAGNOSIS — I10 ESSENTIAL HYPERTENSION: ICD-10-CM

## 2021-08-06 NOTE — TELEPHONE ENCOUNTER
Please contact patient for scheduling. Thanks        Last visit 04/22/2020 Virtual visit NP Tyler Cabrera   Next appointment No show 01/12/2021 - Nothing rescheduled  Previous refill encounter(s)   11/12/2020 Lantus Solostar #15 with 3 refills,   11/24/2020 Norvasc #30 with 3 refills     Requested Prescriptions     Pending Prescriptions Disp Refills    amLODIPine (NORVASC) 5 mg tablet 90 Tablet 0     Sig: Take 1 Tablet by mouth daily.  insulin glargine (Lantus Solostar U-100 Insulin) 100 unit/mL (3 mL) inpn 15 Adjustable Dose Pre-filled Pen Syringe 0     Sig: INJECT 34 UNITS SUBCUTANEOUSLY DAILY.  INCREASE BY 4 UNITS EVERY 4 DAYS UNTIL AM BLOOD SUGAR

## 2021-08-06 NOTE — TELEPHONE ENCOUNTER
Please contact patient for scheduling.  Thanks      Last visit 04/22/2020 Virtual visit NP Jacquie Pearce   Next appointment No show 01/12/2021 - Nothing rescheduled   Previous refill encounter(s)   09/19/2020 One Touch Ultra TS #100 with 7 refills     Requested Prescriptions     Pending Prescriptions Disp Refills    glucose blood VI test strips (OneTouch Ultra Test) strip 100 Strip 7     Sig: Use as directed

## 2021-08-09 ENCOUNTER — TELEPHONE (OUTPATIENT)
Dept: INTERNAL MEDICINE CLINIC | Age: 41
End: 2021-08-09

## 2021-08-09 RX ORDER — BLOOD SUGAR DIAGNOSTIC
STRIP MISCELLANEOUS
Qty: 100 STRIP | Refills: 7 | Status: SHIPPED | OUTPATIENT
Start: 2021-08-09 | End: 2021-10-13 | Stop reason: SDUPTHER

## 2021-08-09 RX ORDER — AMLODIPINE BESYLATE 5 MG/1
5 TABLET ORAL DAILY
Qty: 90 TABLET | Refills: 0 | Status: SHIPPED | OUTPATIENT
Start: 2021-08-09 | End: 2021-09-06

## 2021-08-09 RX ORDER — PEN NEEDLE, DIABETIC 30 GX3/16"
NEEDLE, DISPOSABLE MISCELLANEOUS
Qty: 100 PACKAGE | Refills: 3 | Status: SHIPPED | OUTPATIENT
Start: 2021-08-09

## 2021-08-09 RX ORDER — INSULIN GLARGINE 100 [IU]/ML
INJECTION, SOLUTION SUBCUTANEOUS
Qty: 15 ADJUSTABLE DOSE PRE-FILLED PEN SYRINGE | Refills: 0 | Status: SHIPPED | OUTPATIENT
Start: 2021-08-09 | End: 2021-08-20

## 2021-08-09 NOTE — TELEPHONE ENCOUNTER
Vm left for pt in regards to diabetes medication. When call is returned Please advise patient current pen needle no longer covered.  Alternate pen needle sent to pharmacy

## 2021-08-20 DIAGNOSIS — E11.65 TYPE 2 DIABETES MELLITUS WITH HYPERGLYCEMIA, WITHOUT LONG-TERM CURRENT USE OF INSULIN (HCC): ICD-10-CM

## 2021-08-20 RX ORDER — INSULIN GLARGINE 100 [IU]/ML
INJECTION, SOLUTION SUBCUTANEOUS
Qty: 15 ADJUSTABLE DOSE PRE-FILLED PEN SYRINGE | Refills: 3 | Status: SHIPPED | OUTPATIENT
Start: 2021-08-20 | End: 2021-10-13 | Stop reason: SDUPTHER

## 2021-09-04 DIAGNOSIS — I10 ESSENTIAL HYPERTENSION: ICD-10-CM

## 2021-09-06 RX ORDER — AMLODIPINE BESYLATE 5 MG/1
TABLET ORAL
Qty: 90 TABLET | Refills: 0 | Status: SHIPPED | OUTPATIENT
Start: 2021-09-06 | End: 2022-02-14

## 2021-10-13 DIAGNOSIS — E11.65 TYPE 2 DIABETES MELLITUS WITH HYPERGLYCEMIA, WITHOUT LONG-TERM CURRENT USE OF INSULIN (HCC): ICD-10-CM

## 2021-10-13 RX ORDER — BLOOD SUGAR DIAGNOSTIC
STRIP MISCELLANEOUS
Qty: 100 STRIP | Refills: 7 | Status: SHIPPED | OUTPATIENT
Start: 2021-10-13 | End: 2022-10-29

## 2021-10-13 RX ORDER — INSULIN GLARGINE 100 [IU]/ML
INJECTION, SOLUTION SUBCUTANEOUS
Qty: 15 ADJUSTABLE DOSE PRE-FILLED PEN SYRINGE | Refills: 3 | Status: SHIPPED | OUTPATIENT
Start: 2021-10-13 | End: 2022-03-08

## 2022-02-13 DIAGNOSIS — E11.65 TYPE 2 DIABETES MELLITUS WITH HYPERGLYCEMIA, WITHOUT LONG-TERM CURRENT USE OF INSULIN (HCC): ICD-10-CM

## 2022-02-13 DIAGNOSIS — I10 ESSENTIAL HYPERTENSION: ICD-10-CM

## 2022-02-14 RX ORDER — AMLODIPINE BESYLATE 5 MG/1
TABLET ORAL
Qty: 90 TABLET | Refills: 0 | Status: SHIPPED | OUTPATIENT
Start: 2022-02-14 | End: 2022-05-18

## 2022-03-06 DIAGNOSIS — E11.65 TYPE 2 DIABETES MELLITUS WITH HYPERGLYCEMIA, WITHOUT LONG-TERM CURRENT USE OF INSULIN (HCC): ICD-10-CM

## 2022-03-08 RX ORDER — INSULIN GLARGINE 100 [IU]/ML
INJECTION, SOLUTION SUBCUTANEOUS
Qty: 15 ML | Refills: 3 | Status: SHIPPED | OUTPATIENT
Start: 2022-03-08 | End: 2022-07-23

## 2022-03-14 DIAGNOSIS — J45.20 MILD INTERMITTENT ASTHMA WITHOUT COMPLICATION: ICD-10-CM

## 2022-03-15 RX ORDER — ALBUTEROL SULFATE 90 UG/1
AEROSOL, METERED RESPIRATORY (INHALATION)
Qty: 6.7 EACH | Refills: 3 | Status: SHIPPED | OUTPATIENT
Start: 2022-03-15 | End: 2022-07-23

## 2022-03-19 PROBLEM — R73.9 HYPERGLYCEMIA: Status: ACTIVE | Noted: 2020-02-16

## 2022-03-19 PROBLEM — E66.01 SEVERE OBESITY (HCC): Status: ACTIVE | Noted: 2020-02-27

## 2022-05-18 DIAGNOSIS — I10 ESSENTIAL HYPERTENSION: ICD-10-CM

## 2022-05-18 RX ORDER — AMLODIPINE BESYLATE 5 MG/1
TABLET ORAL
Qty: 90 TABLET | Refills: 0 | Status: SHIPPED | OUTPATIENT
Start: 2022-05-18 | End: 2022-07-23

## 2022-07-22 DIAGNOSIS — I10 ESSENTIAL HYPERTENSION: ICD-10-CM

## 2022-07-22 DIAGNOSIS — E11.65 TYPE 2 DIABETES MELLITUS WITH HYPERGLYCEMIA, WITHOUT LONG-TERM CURRENT USE OF INSULIN (HCC): ICD-10-CM

## 2022-07-22 DIAGNOSIS — J45.20 MILD INTERMITTENT ASTHMA WITHOUT COMPLICATION: ICD-10-CM

## 2022-07-23 RX ORDER — ALBUTEROL SULFATE 90 UG/1
AEROSOL, METERED RESPIRATORY (INHALATION)
Qty: 6.7 EACH | Refills: 3 | Status: SHIPPED | OUTPATIENT
Start: 2022-07-23

## 2022-07-23 RX ORDER — AMLODIPINE BESYLATE 5 MG/1
TABLET ORAL
Qty: 90 TABLET | Refills: 0 | Status: SHIPPED | OUTPATIENT
Start: 2022-07-23 | End: 2022-09-27

## 2022-07-23 RX ORDER — INSULIN GLARGINE 100 [IU]/ML
INJECTION, SOLUTION SUBCUTANEOUS
Qty: 15 ML | Refills: 3 | Status: SHIPPED | OUTPATIENT
Start: 2022-07-23

## 2022-09-23 DIAGNOSIS — I10 ESSENTIAL HYPERTENSION: ICD-10-CM

## 2022-09-27 RX ORDER — AMLODIPINE BESYLATE 5 MG/1
TABLET ORAL
Qty: 90 TABLET | Refills: 0 | Status: SHIPPED | OUTPATIENT
Start: 2022-09-27

## 2022-10-29 RX ORDER — BLOOD SUGAR DIAGNOSTIC
STRIP MISCELLANEOUS
Qty: 25 STRIP | Refills: 87 | Status: SHIPPED | OUTPATIENT
Start: 2022-10-29

## 2022-12-06 DIAGNOSIS — J45.20 MILD INTERMITTENT ASTHMA WITHOUT COMPLICATION: ICD-10-CM

## 2022-12-06 RX ORDER — ALBUTEROL SULFATE 90 UG/1
AEROSOL, METERED RESPIRATORY (INHALATION)
Qty: 6.7 EACH | Refills: 3 | Status: SHIPPED | OUTPATIENT
Start: 2022-12-06

## 2022-12-26 DIAGNOSIS — E11.65 TYPE 2 DIABETES MELLITUS WITH HYPERGLYCEMIA, WITHOUT LONG-TERM CURRENT USE OF INSULIN (HCC): ICD-10-CM

## 2022-12-26 RX ORDER — INSULIN GLARGINE 100 [IU]/ML
INJECTION, SOLUTION SUBCUTANEOUS
Qty: 15 ML | Refills: 0 | Status: SHIPPED | OUTPATIENT
Start: 2022-12-26

## 2023-01-26 DIAGNOSIS — E11.65 TYPE 2 DIABETES MELLITUS WITH HYPERGLYCEMIA, WITHOUT LONG-TERM CURRENT USE OF INSULIN (HCC): ICD-10-CM

## 2023-01-27 RX ORDER — INSULIN GLARGINE 100 [IU]/ML
INJECTION, SOLUTION SUBCUTANEOUS
Qty: 15 ML | Refills: 0 | Status: SHIPPED | OUTPATIENT
Start: 2023-01-27

## 2023-02-25 DIAGNOSIS — E11.65 TYPE 2 DIABETES MELLITUS WITH HYPERGLYCEMIA, WITHOUT LONG-TERM CURRENT USE OF INSULIN (HCC): ICD-10-CM

## 2023-02-25 DIAGNOSIS — I10 ESSENTIAL HYPERTENSION: ICD-10-CM

## 2023-03-01 RX ORDER — AMLODIPINE BESYLATE 5 MG/1
TABLET ORAL
Qty: 30 TABLET | Refills: 0 | Status: SHIPPED | OUTPATIENT
Start: 2023-03-01

## 2023-03-01 RX ORDER — INSULIN GLARGINE 100 [IU]/ML
INJECTION, SOLUTION SUBCUTANEOUS
Qty: 5 ADJUSTABLE DOSE PRE-FILLED PEN SYRINGE | Refills: 0 | Status: SHIPPED | OUTPATIENT
Start: 2023-03-01

## 2023-05-28 DIAGNOSIS — E11.65 TYPE 2 DIABETES MELLITUS WITH HYPERGLYCEMIA (HCC): ICD-10-CM

## 2023-06-02 RX ORDER — EMPAGLIFLOZIN 10 MG/1
TABLET, FILM COATED ORAL
Qty: 30 TABLET | Refills: 0 | Status: SHIPPED | OUTPATIENT
Start: 2023-06-02

## 2023-10-02 ENCOUNTER — TELEPHONE (OUTPATIENT)
Age: 43
End: 2023-10-02

## 2023-10-02 NOTE — TELEPHONE ENCOUNTER
Received recall alert for albuterol lot #s [id number]. [id number]. [id number], [id number], [id number], [id number]   Pt to be contacted regarding the recall to check his lot number on his medication. Pt also to be informed to schedule an appt to est care with PCP.

## 2025-08-27 ENCOUNTER — OFFICE VISIT (OUTPATIENT)
Age: 45
End: 2025-08-27

## 2025-08-27 VITALS
WEIGHT: 245 LBS | DIASTOLIC BLOOD PRESSURE: 85 MMHG | TEMPERATURE: 100 F | SYSTOLIC BLOOD PRESSURE: 142 MMHG | RESPIRATION RATE: 18 BRPM | HEART RATE: 121 BPM | OXYGEN SATURATION: 96 %

## 2025-08-27 DIAGNOSIS — J45.901 EXACERBATION OF ASTHMA, UNSPECIFIED ASTHMA SEVERITY, UNSPECIFIED WHETHER PERSISTENT: Primary | ICD-10-CM

## 2025-08-27 DIAGNOSIS — I51.7 CARDIOMEGALY: ICD-10-CM

## 2025-08-27 DIAGNOSIS — I10 UNCONTROLLED HYPERTENSION: ICD-10-CM

## 2025-08-27 DIAGNOSIS — R50.9 FEVER, UNSPECIFIED FEVER CAUSE: ICD-10-CM

## 2025-08-27 DIAGNOSIS — R00.0 TACHYCARDIA: ICD-10-CM

## 2025-08-27 LAB
Lab: NORMAL
PERFORMING INSTRUMENT: NORMAL
QC PASS/FAIL: NORMAL
SARS-COV-2, POC: NORMAL

## 2025-08-27 RX ORDER — IBUPROFEN 200 MG
600 TABLET ORAL ONCE
Status: SHIPPED | OUTPATIENT
Start: 2025-08-27

## 2025-08-27 RX ORDER — ALBUTEROL SULFATE 90 UG/1
2 INHALANT RESPIRATORY (INHALATION) EVERY 6 HOURS PRN
Qty: 18 G | Refills: 3 | Status: SHIPPED | OUTPATIENT
Start: 2025-08-27